# Patient Record
Sex: MALE | Race: WHITE | NOT HISPANIC OR LATINO
[De-identification: names, ages, dates, MRNs, and addresses within clinical notes are randomized per-mention and may not be internally consistent; named-entity substitution may affect disease eponyms.]

---

## 2019-07-29 PROBLEM — Z00.00 ENCOUNTER FOR PREVENTIVE HEALTH EXAMINATION: Status: ACTIVE | Noted: 2019-07-29

## 2019-10-03 ENCOUNTER — NON-APPOINTMENT (OUTPATIENT)
Age: 54
End: 2019-10-03

## 2019-10-03 ENCOUNTER — APPOINTMENT (OUTPATIENT)
Dept: CARDIOLOGY | Facility: CLINIC | Age: 54
End: 2019-10-03
Payer: COMMERCIAL

## 2019-10-03 ENCOUNTER — LABORATORY RESULT (OUTPATIENT)
Age: 54
End: 2019-10-03

## 2019-10-03 VITALS
HEART RATE: 77 BPM | DIASTOLIC BLOOD PRESSURE: 80 MMHG | SYSTOLIC BLOOD PRESSURE: 120 MMHG | TEMPERATURE: 98 F | BODY MASS INDEX: 38.09 KG/M2 | OXYGEN SATURATION: 97 % | HEIGHT: 66 IN | WEIGHT: 237 LBS

## 2019-10-03 DIAGNOSIS — Z78.9 OTHER SPECIFIED HEALTH STATUS: ICD-10-CM

## 2019-10-03 DIAGNOSIS — E78.5 HYPERLIPIDEMIA, UNSPECIFIED: ICD-10-CM

## 2019-10-03 DIAGNOSIS — Z00.00 ENCOUNTER FOR GENERAL ADULT MEDICAL EXAMINATION W/OUT ABNORMAL FINDINGS: ICD-10-CM

## 2019-10-03 DIAGNOSIS — Z86.69 PERSONAL HISTORY OF OTHER DISEASES OF THE NERVOUS SYSTEM AND SENSE ORGANS: ICD-10-CM

## 2019-10-03 DIAGNOSIS — Z01.810 ENCOUNTER FOR PREPROCEDURAL CARDIOVASCULAR EXAMINATION: ICD-10-CM

## 2019-10-03 DIAGNOSIS — E03.9 HYPOTHYROIDISM, UNSPECIFIED: ICD-10-CM

## 2019-10-03 DIAGNOSIS — Z86.39 PERSONAL HISTORY OF OTHER ENDOCRINE, NUTRITIONAL AND METABOLIC DISEASE: ICD-10-CM

## 2019-10-03 DIAGNOSIS — I10 ESSENTIAL (PRIMARY) HYPERTENSION: ICD-10-CM

## 2019-10-03 DIAGNOSIS — Z82.5 FAMILY HISTORY OF ASTHMA AND OTHER CHRONIC LOWER RESPIRATORY DISEASES: ICD-10-CM

## 2019-10-03 DIAGNOSIS — G47.30 SLEEP APNEA, UNSPECIFIED: ICD-10-CM

## 2019-10-03 DIAGNOSIS — J45.990 EXERCISE INDUCED BRONCHOSPASM: ICD-10-CM

## 2019-10-03 DIAGNOSIS — Z87.39 PERSONAL HISTORY OF OTHER DISEASES OF THE MUSCULOSKELETAL SYSTEM AND CONNECTIVE TISSUE: ICD-10-CM

## 2019-10-03 PROCEDURE — 90471 IMMUNIZATION ADMIN: CPT

## 2019-10-03 PROCEDURE — 93000 ELECTROCARDIOGRAM COMPLETE: CPT

## 2019-10-03 PROCEDURE — 93306 TTE W/DOPPLER COMPLETE: CPT

## 2019-10-03 PROCEDURE — 90686 IIV4 VACC NO PRSV 0.5 ML IM: CPT

## 2019-10-03 NOTE — PHYSICAL EXAM
[General Appearance - Well Developed] : well developed [Well Groomed] : well groomed [Normal Appearance] : normal appearance [General Appearance - Well Nourished] : well nourished [General Appearance - In No Acute Distress] : no acute distress [No Deformities] : no deformities [Normal Conjunctiva] : the conjunctiva exhibited no abnormalities [Eyelids - No Xanthelasma] : the eyelids demonstrated no xanthelasmas [Normal Oral Mucosa] : normal oral mucosa [No Oral Pallor] : no oral pallor [No Oral Cyanosis] : no oral cyanosis [Normal Jugular Venous A Waves Present] : normal jugular venous A waves present [No Jugular Venous Michael A Waves] : no jugular venous michael A waves [Normal Jugular Venous V Waves Present] : normal jugular venous V waves present [Exaggerated Use Of Accessory Muscles For Inspiration] : no accessory muscle use [Respiration, Rhythm And Depth] : normal respiratory rhythm and effort [Auscultation Breath Sounds / Voice Sounds] : lungs were clear to auscultation bilaterally [Abdomen Soft] : soft [Abdomen Tenderness] : non-tender [Abdomen Mass (___ Cm)] : no abdominal mass palpated [Abnormal Walk] : normal gait [Nail Clubbing] : no clubbing of the fingernails [Gait - Sufficient For Exercise Testing] : the gait was sufficient for exercise testing [Cyanosis, Localized] : no localized cyanosis [Petechial Hemorrhages (___cm)] : no petechial hemorrhages [Skin Color & Pigmentation] : normal skin color and pigmentation [] : no rash [No Venous Stasis] : no venous stasis [Skin Lesions] : no skin lesions [No Skin Ulcers] : no skin ulcer [No Xanthoma] : no  xanthoma was observed [Oriented To Time, Place, And Person] : oriented to person, place, and time [Affect] : the affect was normal [Mood] : the mood was normal [No Anxiety] : not feeling anxious [FreeTextEntry1] : Regular rate and rhythm, NL S1, S2, non-displaced PMI, chest non-tender; no rubs,heaves  or gallops a  Grade 2/6 systolic murmur noted at the LSB

## 2019-10-03 NOTE — REASON FOR VISIT
[FreeTextEntry1] : I was asked to see this delightful patient today for Pre-op Evaluation  prior t L2-5 laminectomy and microdiscetomy  with    __ Paul ___  at fax number  [phone]  .  The patient denies fever, cough, wheezing, sputum production, hemoptysis, dyspnea, congestion, diarrhea, constipation, nausea, vomiting, bright red blood per rectum, melena, angina, chest pain, palpitations, diaphoresis, PND, incontinence, frequency, urgency, dysuria, edema, joint pain, headache, weakness, numbness and dizziness. The date of the planned procedure is: 10-\par \par The patient presents for evaluation of high blood pressure. Patient is currently tolerating the current  antihypertensive regime and they deny headaches, stiff neck, visual changes, pedal Edema or PND. They also are here for follow-up of elevated cholesterol. Patient is currently tolerating medication and and does not complain of new  muscle pain, joint pain, back pain,urinary changes ,nausea, vomiting, abdominal pain or diarrhea. The patient is trying to follow a low cholesterol diet.\par The patient is here for follow-up of hypothyroidism. The patient states that they are taking their medicine regularly that they have no new complaints. They deny any  heat or cold intolerance hair loss and fatigue.\par

## 2019-10-04 RX ORDER — DULOXETINE HYDROCHLORIDE 60 MG/1
60 CAPSULE, DELAYED RELEASE PELLETS ORAL
Qty: 90 | Refills: 1 | Status: ACTIVE | COMMUNITY
Start: 2019-10-03 | End: 1900-01-01

## 2019-10-04 RX ORDER — DULOXETINE HYDROCHLORIDE 30 MG/1
30 CAPSULE, DELAYED RELEASE PELLETS ORAL
Qty: 90 | Refills: 1 | Status: ACTIVE | COMMUNITY
Start: 2019-10-03 | End: 1900-01-01

## 2019-10-07 DIAGNOSIS — E11.9 TYPE 2 DIABETES MELLITUS W/OUT COMPLICATIONS: ICD-10-CM

## 2019-10-07 DIAGNOSIS — E78.5 HYPERLIPIDEMIA, UNSPECIFIED: ICD-10-CM

## 2019-10-07 LAB
ABO + RH PNL BLD: NORMAL
ALBUMIN SERPL ELPH-MCNC: 4.8 G/DL
ALP BLD-CCNC: 78 U/L
ALT SERPL-CCNC: 38 U/L
ANION GAP SERPL CALC-SCNC: 14 MMOL/L
APPEARANCE: CLEAR
APTT BLD: 30.5 SEC
AST SERPL-CCNC: 20 U/L
BACTERIA: NEGATIVE
BASOPHILS # BLD AUTO: 0.04 K/UL
BASOPHILS NFR BLD AUTO: 0.4 %
BILIRUB SERPL-MCNC: 1 MG/DL
BILIRUBIN URINE: NEGATIVE
BLOOD URINE: NEGATIVE
BUN SERPL-MCNC: 13 MG/DL
CALCIUM SERPL-MCNC: 9.9 MG/DL
CHLORIDE SERPL-SCNC: 97 MMOL/L
CHOLEST SERPL-MCNC: 230 MG/DL
CHOLEST/HDLC SERPL: 4.8 RATIO
CO2 SERPL-SCNC: 29 MMOL/L
COLOR: YELLOW
CREAT SERPL-MCNC: 0.8 MG/DL
EOSINOPHIL # BLD AUTO: 0.17 K/UL
EOSINOPHIL NFR BLD AUTO: 1.8 %
ESTIMATED AVERAGE GLUCOSE: 157 MG/DL
GLUCOSE QUALITATIVE U: NEGATIVE
GLUCOSE SERPL-MCNC: 145 MG/DL
HBA1C MFR BLD HPLC: 7.1 %
HCT VFR BLD CALC: 47.1 %
HDLC SERPL-MCNC: 48 MG/DL
HGB BLD-MCNC: 15.1 G/DL
HYALINE CASTS: 0 /LPF
IMM GRANULOCYTES NFR BLD AUTO: 0.3 %
INR PPP: 1.03 RATIO
KETONES URINE: NEGATIVE
LDLC SERPL CALC-MCNC: 144 MG/DL
LEUKOCYTE ESTERASE URINE: NEGATIVE
LYMPHOCYTES # BLD AUTO: 1.85 K/UL
LYMPHOCYTES NFR BLD AUTO: 20.1 %
MAGNESIUM SERPL-MCNC: 1.8 MG/DL
MAN DIFF?: NORMAL
MCHC RBC-ENTMCNC: 30.4 PG
MCHC RBC-ENTMCNC: 32.1 GM/DL
MCV RBC AUTO: 95 FL
MICROSCOPIC-UA: NORMAL
MONOCYTES # BLD AUTO: 0.73 K/UL
MONOCYTES NFR BLD AUTO: 7.9 %
NEUTROPHILS # BLD AUTO: 6.38 K/UL
NEUTROPHILS NFR BLD AUTO: 69.5 %
NITRITE URINE: NEGATIVE
PH URINE: 7
PHOSPHATE SERPL-MCNC: 2.7 MG/DL
PLATELET # BLD AUTO: 191 K/UL
POTASSIUM SERPL-SCNC: 4.1 MMOL/L
PROT SERPL-MCNC: 7.5 G/DL
PROTEIN URINE: NEGATIVE
PSA SERPL-MCNC: 0.47 NG/ML
PT BLD: 11.7 SEC
RBC # BLD: 4.96 M/UL
RBC # FLD: 14.1 %
RED BLOOD CELLS URINE: 1 /HPF
SODIUM SERPL-SCNC: 140 MMOL/L
SPECIFIC GRAVITY URINE: 1.02
SQUAMOUS EPITHELIAL CELLS: 1 /HPF
T3RU NFR SERPL: 1 TBI
T4 FREE SERPL-MCNC: 1.3 NG/DL
T4 SERPL-MCNC: 7.4 UG/DL
TRIGL SERPL-MCNC: 189 MG/DL
TSH SERPL-ACNC: 2.2 UIU/ML
URATE SERPL-MCNC: 5.7 MG/DL
UROBILINOGEN URINE: NORMAL
WBC # FLD AUTO: 9.2 K/UL
WHITE BLOOD CELLS URINE: 1 /HPF

## 2019-10-07 RX ORDER — LOSARTAN POTASSIUM AND HYDROCHLOROTHIAZIDE 12.5; 5 MG/1; MG/1
50-12.5 TABLET ORAL DAILY
Qty: 90 | Refills: 1 | Status: DISCONTINUED | COMMUNITY
Start: 2019-10-03 | End: 2019-10-07

## 2019-10-07 RX ORDER — METFORMIN HYDROCHLORIDE 500 MG/1
500 TABLET, COATED ORAL DAILY
Qty: 90 | Refills: 1 | Status: ACTIVE | COMMUNITY
Start: 2019-10-07 | End: 1900-01-01

## 2019-10-07 RX ORDER — ROSUVASTATIN CALCIUM 10 MG/1
10 TABLET, FILM COATED ORAL
Qty: 90 | Refills: 1 | Status: ACTIVE | COMMUNITY
Start: 2019-10-07 | End: 1900-01-01

## 2019-10-16 ENCOUNTER — APPOINTMENT (OUTPATIENT)
Dept: CARDIOLOGY | Facility: CLINIC | Age: 54
End: 2019-10-16
Payer: COMMERCIAL

## 2019-10-16 PROCEDURE — A9500: CPT

## 2019-10-16 PROCEDURE — 78452 HT MUSCLE IMAGE SPECT MULT: CPT

## 2019-10-16 PROCEDURE — 93015 CV STRESS TEST SUPVJ I&R: CPT

## 2020-03-17 DIAGNOSIS — J45.909 UNSPECIFIED ASTHMA, UNCOMPLICATED: ICD-10-CM

## 2020-03-17 RX ORDER — ALBUTEROL SULFATE 90 UG/1
108 (90 BASE) POWDER, METERED RESPIRATORY (INHALATION)
Qty: 1 | Refills: 3 | Status: ACTIVE | COMMUNITY
Start: 2020-03-17 | End: 1900-01-01

## 2020-03-27 RX ORDER — IRBESARTAN AND HYDROCHLOROTHIAZIDE 150; 12.5 MG/1; MG/1
150-12.5 TABLET ORAL
Qty: 90 | Refills: 1 | Status: ACTIVE | COMMUNITY
Start: 2019-10-07 | End: 1900-01-01

## 2020-11-11 ENCOUNTER — TRANSCRIPTION ENCOUNTER (OUTPATIENT)
Age: 55
End: 2020-11-11

## 2020-12-02 ENCOUNTER — TRANSCRIPTION ENCOUNTER (OUTPATIENT)
Age: 55
End: 2020-12-02

## 2021-01-01 ENCOUNTER — TRANSCRIPTION ENCOUNTER (OUTPATIENT)
Age: 56
End: 2021-01-01

## 2021-01-15 ENCOUNTER — TRANSCRIPTION ENCOUNTER (OUTPATIENT)
Age: 56
End: 2021-01-15

## 2021-02-05 ENCOUNTER — TRANSCRIPTION ENCOUNTER (OUTPATIENT)
Age: 56
End: 2021-02-05

## 2021-03-03 ENCOUNTER — TRANSCRIPTION ENCOUNTER (OUTPATIENT)
Age: 56
End: 2021-03-03

## 2021-04-22 ENCOUNTER — TRANSCRIPTION ENCOUNTER (OUTPATIENT)
Age: 56
End: 2021-04-22

## 2021-06-03 ENCOUNTER — TRANSCRIPTION ENCOUNTER (OUTPATIENT)
Age: 56
End: 2021-06-03

## 2021-11-16 ENCOUNTER — TRANSCRIPTION ENCOUNTER (OUTPATIENT)
Age: 56
End: 2021-11-16

## 2022-03-10 ENCOUNTER — TRANSCRIPTION ENCOUNTER (OUTPATIENT)
Age: 57
End: 2022-03-10

## 2022-03-15 ENCOUNTER — APPOINTMENT (OUTPATIENT)
Dept: ENDOCRINOLOGY | Facility: CLINIC | Age: 57
End: 2022-03-15

## 2022-03-17 ENCOUNTER — TRANSCRIPTION ENCOUNTER (OUTPATIENT)
Age: 57
End: 2022-03-17

## 2022-04-19 ENCOUNTER — APPOINTMENT (OUTPATIENT)
Dept: PAIN MANAGEMENT | Facility: CLINIC | Age: 57
End: 2022-04-19

## 2022-04-27 ENCOUNTER — APPOINTMENT (OUTPATIENT)
Dept: PAIN MANAGEMENT | Facility: CLINIC | Age: 57
End: 2022-04-27

## 2022-05-29 ENCOUNTER — NON-APPOINTMENT (OUTPATIENT)
Age: 57
End: 2022-05-29

## 2022-07-28 ENCOUNTER — NON-APPOINTMENT (OUTPATIENT)
Age: 57
End: 2022-07-28

## 2022-07-29 ENCOUNTER — APPOINTMENT (OUTPATIENT)
Dept: PAIN MANAGEMENT | Facility: CLINIC | Age: 57
End: 2022-07-29
Payer: MEDICARE

## 2022-07-29 VITALS — HEIGHT: 66 IN | WEIGHT: 222 LBS | BODY MASS INDEX: 35.68 KG/M2

## 2022-07-29 PROCEDURE — J3490N: CUSTOM | Mod: NC

## 2022-07-29 PROCEDURE — 99214 OFFICE O/P EST MOD 30 MIN: CPT | Mod: 25

## 2022-07-29 PROCEDURE — J3490M: CUSTOM

## 2022-07-29 PROCEDURE — 20553 NJX 1/MLT TRIGGER POINTS 3/>: CPT

## 2022-07-29 NOTE — HISTORY OF PRESENT ILLNESS
[Lower back] : lower back [7] : 7 [2] : 2 [Sharp] : sharp [Shooting] : shooting [Stabbing] : stabbing [] : yes [Constant] : constant [Meds] : meds [Ice] : ice [Sitting] : sitting [Stairs] : stairs [FreeTextEntry1] : 07/29/2022: follow up today.  Would like repeat RFA.\par \par 2/14/22- Patient here for follow up. Complains of pain in left leg radiating down to toes. Will schedule left L4-5, L5-S1. Also complains of pain in low back. Will repeat B/L lumbar RFA.\par \par 11/15/21- Patient here for f/u. Would like a repeat B/L SIjoint RFA.\par \par 9/1/21: follow up today. pt reports increasing pain in the lower back with radiation to the SIJ's.\par \par 6/21/21: follow up today after caudal ROMULO on 6/8/21. He reports improvement. Right SIJ RFA was June of last year. and left RFA was in November of last year.\par \par 5/10/21: follow up today after caudal on 4/27/21. Had 80% relief from Caudal. Will Give TPI for tightness in right\par buttock.\par \par 4/20/21: follow up today after BERYL on 3/8/21. Since last visit had right shoulder surgery. (Dr. Vizcaino) on \par \par 3/30/21. Has not yet started PT. Neck feels better after BERYL.\par \par 3/3/21: follow up to review MRI. (3/1/21) 1. Straightening of lordosis with diffuse loss of disc signal and height and\par multilevel anterior spurring and bulging.\par 2. C2-C3: Bulge with central herniation.\par 3. C3-C4: Broad bulge and spondylotic ridge with central herniation. Luschka hypertrophy.\par 4. C4-C5: Broad bulge, spondylotic ridge, and broad herniation impressing on the thecal sac. Luschka\par hypertrophy with foraminal stenosis right greater than left.\par 5. C5-C6: Broad bulge, spondylotic ridge, and broad asymmetric to right and left herniation impressing on the\par thecal sac contacting the cord with mild central stenosis. Luschka hypertrophy with foraminal stenosis.\par 6. C6-C7: Broad bulge, spondylotic ridge, and central and asymmetric to right herniation impressing on the\par thecal sac. Luschka hypertrophy with foraminal stenosis left greater than right. Pain is radiating to the right shoulder and down the right arm.\par \par 2/23/21: follow up today after left L4/5 L5/S1 on 2/10/21. He reports an overall 80% improvement following. as his left side is feeling better, his right side is getting a little worse.\par \par 2/2/21: follow up today after b/l lumbar RFA on 1/19/21. getting gradual improvement. Had new MRI which was\par reviewed. It revealed L3/4 moderate to marked disc height loss. wide decompression and laminectomy with\par decompression of the spinal canal. L4/5 moderate to marked disc space height loss. wide decompression of the spinal canal without stenosis. broad based left disc herniation with impingement of the left L5 nerve root. facet arthrosis. moderate to marked left NF stenosis with impingement of the left L4 nerve root. L5/S1: wide decompression Dr. Miller is recommending a left L4 and L5 TFESI to address his left leg pain.\par \par 12/22/20: follow up today after bilatearl L4/5 TFESI on 12/7/20. Pt with great relief following. He reports the sciatica has resolved. Pain continues over the right lower back. will give tpi today.\par \par 11/30/20- Patient would like TPI today. Will do without steroid as it is too soon. Wants to schedule left SI joint RFA.\par \par 11/2/20: follow up today. Gets good relief with TPI's. Gets RFA's with good relief. Last right SIJ RFA was in May with relief until recently. Pain in the lower back. unable to have Caudal due to recent surgery. Intermittent pain in the bilateral legs.\par \par 9/21/20- Patient had 80% relief from lumbar RFA. Cannot have SIJ RFA until November and December. TPI today\par \par 7.13.20 follow up today after left SIJ RFA. Pt reports an overall 85% relief following. Pain in the lower back. Had lumbar RFA in 12/2018 with relief until recently. He was able to walk longer distances and ROM improved as well. [FreeTextEntry7] : both legs  [FreeTextEntry9] : RFA [de-identified] : Twisting

## 2022-07-29 NOTE — ASSESSMENT
[FreeTextEntry1] : Patient presents with axial lumbar pain that has not responded to  3 months of conservative therapy including physical therapy or NSAID therapy.  The pain is interfering with activities of daily living and functionality.   There is no radicular pain.   The pain is exacerbated by facet loading.  Positive Kemps maneuver which is defined by pain reproduction with extension and rotation of the lumbar spine to the affected side.  The patient has not had a vertebral fusion at the levels of the proposed treatment.  There is no unexplained neurologic deficit.  There is no history of systemic infection, unstable medical condition, bleeding tendency, or local infection.  The injection is being performed to diagnose the facet joint as the source of the individual's pain.\par \par After discussing various treatment options with the patient including but not limited to oral medications, physical therapy, exercise, modalities as well as interventional spinal injections, we have decided with the following plan:\par \par 1) Intervention Injection Therapy:\par I personally reviewed the MRI/CT scan images and agree with the radiologist's report. The radiological findings were discussed with the patient.\par The risks, benefits, contents and alternatives to injection were explained in full to the patient. Risks outlined include but are not limited to infection,sepsis, bleeding, post-dural puncture headache, nerve damage, temporary increase in pain, syncopal episode, failure to resolve symptoms, allergic reaction, symptom recurrence, and elevation of blood sugar in diabetics. Cortisone may cause immunosuppression. Patient understands the risks. All questions were answered. After discussion of options, patient requested an injection. Information regarding the injection was given to the patient. Which medications to stop prior to the injection was explained to the patient as well.\par \par Follow up in 1-2 weeks post injection for re-evaluation. \par Continue Home exercises, stretching, activity modification, physical therapy, and conservative care.\par \par \par \par B/L lumbar RFA

## 2022-07-31 ENCOUNTER — NON-APPOINTMENT (OUTPATIENT)
Age: 57
End: 2022-07-31

## 2022-08-02 ENCOUNTER — APPOINTMENT (OUTPATIENT)
Dept: PAIN MANAGEMENT | Facility: CLINIC | Age: 57
End: 2022-08-02

## 2022-08-02 PROCEDURE — J3490M: CUSTOM

## 2022-08-02 PROCEDURE — 64636 DESTROY L/S FACET JNT ADDL: CPT | Mod: RT

## 2022-08-02 PROCEDURE — 64635 DESTROY LUMB/SAC FACET JNT: CPT | Mod: RT

## 2022-08-02 NOTE — PROCEDURE
[FreeTextEntry3] : Date of Service: 08/02/2022 \par \par Account: [id number]\par \par Patient: DEEDEE SANZ \par \par YOB: 1965\par \par Age: 57 year\par \par \par PREOPERATIVE DIAGNOSIS: Spondylosis of Lumbar Region without Myelopathy or Radiculopathy (M47.816)\par \par     1. 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 \par \par         PROCEDURE:\par \par         1) Right L3, L4, L5 and Left L3, L4, L5 medial branch radiofrequency ablation under fluoroscopic guidance.                        \par \par Anesthesia:                                                      MAC\par \par \par Risks, benefits and alternatives of the procedure were discussed with the patient after which they agreed to proceed.  Patient was brought into fluoroscopy suite and was placed in prone position with hip support. Back was prepped and draped in a sterile fashion x3. Anesthesia initiated.\par \par Under AP visualization, the right and left sacral ala was identified and marked. Using a 25 gauge ½ inch needle the skin and subcutaneous structures at this point were localized with 1% Lidocaine using approximately 3 cc's 1% Lidocaine.  After this, a 20 gauge 100mm Kesha radiofrequency needle with a 10mm curved tip was inserted and using depth direction depth technique under constant fluoroscopic visualization, the needle was advanced to the sacral ala until os was contacted. \par \par The camera was then redirected under AP view to visualize the right and left L4 and L5 vertebra. The camera was obliqued to approximately 30 degrees to reveal good Mark dog anatomical view. The junction of the superior articulate process and transverse process at the L4 and L5 levels  were then identified and marked. Skin and subcutaneous structures were then anesthetized with approximately 3 cc's of 1% Lidocaine at each of these levels. After which a 20 gauge 100mm Newton radiofrequency needle with a 10mm curved tip then advanced until the junction at the SAP and transverse process was met.  The camera was then directed through the lateral view and under constant fluoroscopic visualization, the needle tips were then advanced and confirmed at the junction of the SAP and transverse process. \par \par The stylette for the most cephalad needle at the L4 level was then removed and a Newton radiofrequency probe was then placed inside the needle. After their pedis' were checked at approximately 300 ohm, 50 hertz sensory stimulation was performed.  Patient experienced concordant pain in their low back at approximately 0.3 volts. The voltage was then increased to 1 volt. The patient reported increased low back pain symptoms without any radiation below the knee.  Stimulation was then changed to 2 hertz and increased slowly by .1 volt increments at approximately 0.5 volts, patient began to experience thumping like reproductive pain in their low back. The voltage was then increased to approximately 2.5 volts. Patient experienced increasing thumping without any sensation below their knee. This exact stimulation was then repeated for the L5 needle as well as sacral ala needle with concordant pain and no radiation below the knee. The 6 levels were then anesthetized with approximately 0.5 cc's of 1% Lidocaine. After which each area was then ablated at 80 degrees centigrade for 90 seconds each. Patient felt no pain reproduction during the ablation procedure.  After each of these levels were ablated and injected approximately 1 cc of 0.25% Bupivacaine plus 80 mg of Kenalog were then injected before the needles were removed.  Pressure was then applied to the low back. Band-aids were applied.  Patient was brought to the recovery, ambulated on their own after the procedure and reported decreased low back pain.\par \par Anesthesia personnel were present throughout the procedure. Patient was told to apply ice to the low back for 20 minutes on and 20 minutes off for focal symptoms for 24-48 hours. They should call the office if they have any questions or concerns. \par \par Jenny Em M.D.\par

## 2022-08-16 ENCOUNTER — APPOINTMENT (OUTPATIENT)
Dept: PAIN MANAGEMENT | Facility: CLINIC | Age: 57
End: 2022-08-16

## 2022-09-19 ENCOUNTER — APPOINTMENT (OUTPATIENT)
Dept: PAIN MANAGEMENT | Facility: CLINIC | Age: 57
End: 2022-09-19

## 2022-09-19 VITALS — HEIGHT: 66 IN | WEIGHT: 222 LBS | BODY MASS INDEX: 35.68 KG/M2

## 2022-09-19 PROCEDURE — J3490M: CUSTOM

## 2022-09-19 PROCEDURE — 20552 NJX 1/MLT TRIGGER POINT 1/2: CPT

## 2022-09-19 PROCEDURE — 99214 OFFICE O/P EST MOD 30 MIN: CPT | Mod: 25

## 2022-09-19 NOTE — HISTORY OF PRESENT ILLNESS
[Lower back] : lower back [Ice] : ice [Sitting] : sitting [3] : 3 [1] : 2 [Dull/Aching] : dull/aching [Radiating] : radiating [Throbbing] : throbbing [Constant] : constant [Rest] : rest [Injection therapy] : injection therapy [Stairs] : stairs [FreeTextEntry1] : 09/19/2022: follow up today for B/L RFA on 8/2.  Had 80% relief so far.  Will give TPI and schedule TFESI.  \par 07/29/2022: follow up today.  Would like repeat RFA.\par \par 2/14/22- Patient here for follow up. Complains of pain in left leg radiating down to toes. Will schedule left L4-5, L5-S1. Also complains of pain in low back. Will repeat B/L lumbar RFA.\par \par 11/15/21- Patient here for f/u. Would like a repeat B/L SIjoint RFA.\par \par 9/1/21: follow up today. pt reports increasing pain in the lower back with radiation to the SIJ's.\par \par 6/21/21: follow up today after caudal ROMULO on 6/8/21. He reports improvement. Right SIJ RFA was June of last year. and left RFA was in November of last year.\par \par 5/10/21: follow up today after caudal on 4/27/21. Had 80% relief from Caudal. Will Give TPI for tightness in right\par buttock.\par \par 4/20/21: follow up today after BERYL on 3/8/21. Since last visit had right shoulder surgery. (Dr. Vizcaino) on \par \par 3/30/21. Has not yet started PT. Neck feels better after BERYL.\par \par 3/3/21: follow up to review MRI. (3/1/21) 1. Straightening of lordosis with diffuse loss of disc signal and height and\par multilevel anterior spurring and bulging.\par 2. C2-C3: Bulge with central herniation.\par 3. C3-C4: Broad bulge and spondylotic ridge with central herniation. Luschka hypertrophy.\par 4. C4-C5: Broad bulge, spondylotic ridge, and broad herniation impressing on the thecal sac. Luschka\par hypertrophy with foraminal stenosis right greater than left.\par 5. C5-C6: Broad bulge, spondylotic ridge, and broad asymmetric to right and left herniation impressing on the\par thecal sac contacting the cord with mild central stenosis. Luschka hypertrophy with foraminal stenosis.\par 6. C6-C7: Broad bulge, spondylotic ridge, and central and asymmetric to right herniation impressing on the\par thecal sac. Luschka hypertrophy with foraminal stenosis left greater than right. Pain is radiating to the right shoulder and down the right arm.\par \par 2/23/21: follow up today after left L4/5 L5/S1 on 2/10/21. He reports an overall 80% improvement following. as his left side is feeling better, his right side is getting a little worse.\par \par 2/2/21: follow up today after b/l lumbar RFA on 1/19/21. getting gradual improvement. Had new MRI which was\par reviewed. It revealed L3/4 moderate to marked disc height loss. wide decompression and laminectomy with\par decompression of the spinal canal. L4/5 moderate to marked disc space height loss. wide decompression of the spinal canal without stenosis. broad based left disc herniation with impingement of the left L5 nerve root. facet arthrosis. moderate to marked left NF stenosis with impingement of the left L4 nerve root. L5/S1: wide decompression Dr. Miller is recommending a left L4 and L5 TFESI to address his left leg pain.\par \par 12/22/20: follow up today after bilatearl L4/5 TFESI on 12/7/20. Pt with great relief following. He reports the sciatica has resolved. Pain continues over the right lower back. will give tpi today.\par \par 11/30/20- Patient would like TPI today. Will do without steroid as it is too soon. Wants to schedule left SI joint RFA.\par \par 11/2/20: follow up today. Gets good relief with TPI's. Gets RFA's with good relief. Last right SIJ RFA was in May with relief until recently. Pain in the lower back. unable to have Caudal due to recent surgery. Intermittent pain in the bilateral legs.\par \par 9/21/20- Patient had 80% relief from lumbar RFA. Cannot have SIJ RFA until November and December. TPI today\par \par 7.13.20 follow up today after left SIJ RFA. Pt reports an overall 85% relief following. Pain in the lower back. Had lumbar RFA in 12/2018 with relief until recently. He was able to walk longer distances and ROM improved as well. [] : no [FreeTextEntry7] : both legs  [FreeTextEntry9] : RFA [de-identified] : driving

## 2022-09-30 ENCOUNTER — APPOINTMENT (OUTPATIENT)
Dept: PAIN MANAGEMENT | Facility: CLINIC | Age: 57
End: 2022-09-30
Payer: MEDICARE

## 2022-09-30 ENCOUNTER — TRANSCRIPTION ENCOUNTER (OUTPATIENT)
Age: 57
End: 2022-09-30

## 2022-09-30 PROCEDURE — 64483 NJX AA&/STRD TFRM EPI L/S 1: CPT | Mod: 1L,RT

## 2022-09-30 NOTE — PROCEDURE
[FreeTextEntry3] : Date of Service: 09/30/2022 \par \par Account: [id number]\par \par Patient: DEEDEE SANZ \par \par YOB: 1965\par \par Age: 57 year\par   \par \par Surgeon:                               Jenny Em M.D.\par \par Assistant:                                 None.\par  \par Pre-Operative Diagnosis:     Lumbosacral Radiculitis (M54.17)                \par  \par Post Operative Diagnosis:    Lumbosacral Radiculitis (M54.17)                \par  \par Procedure:                              Right L4-5 transforaminal epidural steroid injection\par \par                                                   Left L4-5 transforaminal epidural steroid injection under fluoroscopic guidance.\par \par Anesthesia:                             MAC\par \par \par This procedure was carried out using fluoroscopic guidance.  The risks and benefits of the procedure were discussed extensively with the patient.  The consent of the patient was obtained and the following procedure was performed. The patient was placed in the prone position on the fluoroscopic table and the lumbar area was prepped and draped in a sterile fashion.\par \par The left L4-5 neural foramen was then identified on left oblique  "isidro dog" anatomical view at the 6 o' clock position using fluoroscopic guidance, and the area was marked. The overlying skin and subcutaneous structures were anesthetized using sterile technique with 1% Lidocaine.  A 22 gauge spinal needle was directed toward the inferior (6 o'clock) position of the pedicle, which formed the roof of the identified foramen.  Once in the epidural space, after negative aspiration for heme and CSF, 1cc of Omnipaque contrast was injected to confirm epidural location and assess filling defects and rule out intravascular needle placement.\par \par The following contrast flow observed: no intravascular or intrathecal flow pattern was noted.  No blood or CSF was aspirated. Omnipaque spread medially in epidural space. \par \par After this, an injectate of 3 cc preservative free normal saline plus 40 mg of Kenalog was injected in the epidural space.\par \par The right L4-5 neural foramen was then identified on right oblique "isidro dog" anatomical view at the 6 o' clock position using fluoroscopic guidance, and the area was marked. The overlying skin and subcutaneous structures were anesthetized using sterile technique with 1% Lidocaine.  A 22 gauge spinal needle was directed toward the inferior (6 o'clock) position of the pedicle, which formed the roof of the identified foramen.  Once in the epidural space, after negative aspiration for heme and CSF, 1cc of Omnipaque contrast was injected to confirm epidural location and assess filling defects and rule out intravascular needle placement.\par \par The following contrast flow observed: no intravascular or intrathecal flow pattern was noted.  No blood or CSF was aspirated. Omnipaque spread medially in epidural space. \par \par After this, an injectate of 3 cc preservative free normal saline plus 40 mg of Kenalog was injected in the epidural space.\par \par The needle was subsequently removed.  Vital signs remained normal.  Pulse oximeter was used throughout the procedure and the patient's pulse and oxygen saturation remained within normal limits.  The patient tolerated the procedure well.  There were no complications.  The patient was instructed to apply ice over the injection sites for twenty minutes every two hours for the next 24 to 48 hours.  The patient was also instructed to contact me immediately if there were any problems.\par \par Jenny Em M.D.

## 2022-10-19 ENCOUNTER — APPOINTMENT (OUTPATIENT)
Dept: PAIN MANAGEMENT | Facility: CLINIC | Age: 57
End: 2022-10-19

## 2022-11-08 ENCOUNTER — APPOINTMENT (OUTPATIENT)
Dept: PAIN MANAGEMENT | Facility: CLINIC | Age: 57
End: 2022-11-08

## 2022-11-08 VITALS — BODY MASS INDEX: 35.68 KG/M2 | WEIGHT: 222 LBS | HEIGHT: 66 IN

## 2022-11-08 PROCEDURE — ZZZZZ: CPT

## 2022-11-08 NOTE — HISTORY OF PRESENT ILLNESS
[Lower back] : lower back [Radiating] : radiating [Throbbing] : throbbing [Constant] : constant [Rest] : rest [Ice] : ice [Injection therapy] : injection therapy [Sitting] : sitting [Stairs] : stairs [6] : 6 [2] : 2 [Sharp] : sharp [Meds] : meds [FreeTextEntry1] : 11/8/22: follow up after B/L L4-5 TFESI on 9/30/22.  Had 60% relief.  Would like TPI on left side and repeat injection.\par \par 09/19/2022: follow up today for B/L RFA on 8/2.  Had 80% relief so far.  Will give TPI and schedule TFESI.  \par 07/29/2022: follow up today.  Would like repeat RFA.\par \par 2/14/22- Patient here for follow up. Complains of pain in left leg radiating down to toes. Will schedule left L4-5, L5-S1. Also complains of pain in low back. Will repeat B/L lumbar RFA.\par \par 11/15/21- Patient here for f/u. Would like a repeat B/L SIjoint RFA.\par \par 9/1/21: follow up today. pt reports increasing pain in the lower back with radiation to the SIJ's.\par \par 6/21/21: follow up today after caudal ROMULO on 6/8/21. He reports improvement. Right SIJ RFA was June of last year. and left RFA was in November of last year.\par \par 5/10/21: follow up today after caudal on 4/27/21. Had 80% relief from Caudal. Will Give TPI for tightness in right\par buttock.\par \par 4/20/21: follow up today after BERYL on 3/8/21. Since last visit had right shoulder surgery. (Dr. Vizcaino) on \par \par 3/30/21. Has not yet started PT. Neck feels better after BERYL.\par \par 3/3/21: follow up to review MRI. (3/1/21) 1. Straightening of lordosis with diffuse loss of disc signal and height and\par multilevel anterior spurring and bulging.\par 2. C2-C3: Bulge with central herniation.\par 3. C3-C4: Broad bulge and spondylotic ridge with central herniation. Luschka hypertrophy.\par 4. C4-C5: Broad bulge, spondylotic ridge, and broad herniation impressing on the thecal sac. Luschka\par hypertrophy with foraminal stenosis right greater than left.\par 5. C5-C6: Broad bulge, spondylotic ridge, and broad asymmetric to right and left herniation impressing on the\par thecal sac contacting the cord with mild central stenosis. Luschka hypertrophy with foraminal stenosis.\par 6. C6-C7: Broad bulge, spondylotic ridge, and central and asymmetric to right herniation impressing on the\par thecal sac. Luschka hypertrophy with foraminal stenosis left greater than right. Pain is radiating to the right shoulder and down the right arm.\par \par 2/23/21: follow up today after left L4/5 L5/S1 on 2/10/21. He reports an overall 80% improvement following. as his left side is feeling better, his right side is getting a little worse.\par \par 2/2/21: follow up today after b/l lumbar RFA on 1/19/21. getting gradual improvement. Had new MRI which was\par reviewed. It revealed L3/4 moderate to marked disc height loss. wide decompression and laminectomy with\par decompression of the spinal canal. L4/5 moderate to marked disc space height loss. wide decompression of the spinal canal without stenosis. broad based left disc herniation with impingement of the left L5 nerve root. facet arthrosis. moderate to marked left NF stenosis with impingement of the left L4 nerve root. L5/S1: wide decompression Dr. Miller is recommending a left L4 and L5 TFESI to address his left leg pain.\par \par 12/22/20: follow up today after bilatearl L4/5 TFESI on 12/7/20. Pt with great relief following. He reports the sciatica has resolved. Pain continues over the right lower back. will give tpi today.\par \par 11/30/20- Patient would like TPI today. Will do without steroid as it is too soon. Wants to schedule left SI joint RFA.\par \par 11/2/20: follow up today. Gets good relief with TPI's. Gets RFA's with good relief. Last right SIJ RFA was in May with relief until recently. Pain in the lower back. unable to have Caudal due to recent surgery. Intermittent pain in the bilateral legs.\par \par 9/21/20- Patient had 80% relief from lumbar RFA. Cannot have SIJ RFA until November and December. TPI today\par \par 7.13.20 follow up today after left SIJ RFA. Pt reports an overall 85% relief following. Pain in the lower back. Had lumbar RFA in 12/2018 with relief until recently. He was able to walk longer distances and ROM improved as well. [] : no [FreeTextEntry7] : left hip to the left side of the leg [FreeTextEntry9] : RFA [de-identified] : driving

## 2022-11-21 ENCOUNTER — APPOINTMENT (OUTPATIENT)
Dept: PAIN MANAGEMENT | Facility: CLINIC | Age: 57
End: 2022-11-21
Payer: MEDICARE

## 2022-11-21 PROCEDURE — 64483 NJX AA&/STRD TFRM EPI L/S 1: CPT | Mod: 50

## 2022-12-09 ENCOUNTER — APPOINTMENT (OUTPATIENT)
Dept: PAIN MANAGEMENT | Facility: CLINIC | Age: 57
End: 2022-12-09

## 2022-12-09 VITALS — HEIGHT: 66 IN | WEIGHT: 222 LBS | BODY MASS INDEX: 35.68 KG/M2

## 2022-12-09 PROCEDURE — J3490M: CUSTOM

## 2022-12-09 PROCEDURE — 99214 OFFICE O/P EST MOD 30 MIN: CPT | Mod: 25

## 2022-12-09 PROCEDURE — 20552 NJX 1/MLT TRIGGER POINT 1/2: CPT

## 2022-12-09 NOTE — HISTORY OF PRESENT ILLNESS
[Lower back] : lower back [2] : 2 [Radiating] : radiating [Sharp] : sharp [Throbbing] : throbbing [Rest] : rest [Meds] : meds [Ice] : ice [Injection therapy] : injection therapy [Sitting] : sitting [Stairs] : stairs [5] : 5 [Intermittent] : intermittent [FreeTextEntry1] : 12/9/22: follow up today after  B/L TFESI L4-5 on 11/21/22. Pt with >50% relief of his radicular pain. Still with pain over the bilateral SIJ. \par \par 11/8/22: follow up after B/L L4-5 TFESI on 9/30/22.  Had 60% relief.  Would like TPI on left side and repeat injection.\par \par 09/19/2022: follow up today for B/L RFA on 8/2.  Had 80% relief so far.  Will give TPI and schedule TFESI.  \par 07/29/2022: follow up today.  Would like repeat RFA.\par \par 2/14/22- Patient here for follow up. Complains of pain in left leg radiating down to toes. Will schedule left L4-5, L5-S1. Also complains of pain in low back. Will repeat B/L lumbar RFA.\par \par 11/15/21- Patient here for f/u. Would like a repeat B/L SIjoint RFA.\par \par 9/1/21: follow up today. pt reports increasing pain in the lower back with radiation to the SIJ's.\par \par 6/21/21: follow up today after caudal ROMULO on 6/8/21. He reports improvement. Right SIJ RFA was June of last year. and left RFA was in November of last year.\par \par 5/10/21: follow up today after caudal on 4/27/21. Had 80% relief from Caudal. Will Give TPI for tightness in right\par buttock.\par \par 4/20/21: follow up today after BERYL on 3/8/21. Since last visit had right shoulder surgery. (Dr. Vizcaino) on \par \par 3/30/21. Has not yet started PT. Neck feels better after BERYL.\par \par 3/3/21: follow up to review MRI. (3/1/21) 1. Straightening of lordosis with diffuse loss of disc signal and height and\par multilevel anterior spurring and bulging.\par 2. C2-C3: Bulge with central herniation.\par 3. C3-C4: Broad bulge and spondylotic ridge with central herniation. Luschka hypertrophy.\par 4. C4-C5: Broad bulge, spondylotic ridge, and broad herniation impressing on the thecal sac. Luschka\par hypertrophy with foraminal stenosis right greater than left.\par 5. C5-C6: Broad bulge, spondylotic ridge, and broad asymmetric to right and left herniation impressing on the\par thecal sac contacting the cord with mild central stenosis. Luschka hypertrophy with foraminal stenosis.\par 6. C6-C7: Broad bulge, spondylotic ridge, and central and asymmetric to right herniation impressing on the\par thecal sac. Luschka hypertrophy with foraminal stenosis left greater than right. Pain is radiating to the right shoulder and down the right arm.\par \par 2/23/21: follow up today after left L4/5 L5/S1 on 2/10/21. He reports an overall 80% improvement following. as his left side is feeling better, his right side is getting a little worse.\par \par 2/2/21: follow up today after b/l lumbar RFA on 1/19/21. getting gradual improvement. Had new MRI which was\par reviewed. It revealed L3/4 moderate to marked disc height loss. wide decompression and laminectomy with\par decompression of the spinal canal. L4/5 moderate to marked disc space height loss. wide decompression of the spinal canal without stenosis. broad based left disc herniation with impingement of the left L5 nerve root. facet arthrosis. moderate to marked left NF stenosis with impingement of the left L4 nerve root. L5/S1: wide decompression Dr. Miller is recommending a left L4 and L5 TFESI to address his left leg pain.\par \par 12/22/20: follow up today after bilatearl L4/5 TFESI on 12/7/20. Pt with great relief following. He reports the sciatica has resolved. Pain continues over the right lower back. will give tpi today.\par \par 11/30/20- Patient would like TPI today. Will do without steroid as it is too soon. Wants to schedule left SI joint RFA.\par \par 11/2/20: follow up today. Gets good relief with TPI's. Gets RFA's with good relief. Last right SIJ RFA was in May with relief until recently. Pain in the lower back. unable to have Caudal due to recent surgery. Intermittent pain in the bilateral legs.\par \par 9/21/20- Patient had 80% relief from lumbar RFA. Cannot have SIJ RFA until November and December. TPI today\par \par 7.13.20 follow up today after left SIJ RFA. Pt reports an overall 85% relief following. Pain in the lower back. Had lumbar RFA in 12/2018 with relief until recently. He was able to walk longer distances and ROM improved as well. [] : no [FreeTextEntry7] : left hip to the left side of the leg [FreeTextEntry9] : RFA [de-identified] : driving

## 2022-12-09 NOTE — PROCEDURE
[Bilateral] : bilaterally of the [Lumbar paraspinal muscle] : lumbar paraspinal muscle [___ cc    1%] : Lidocaine ~Vcc of 1%  [___ cc    0.25%] : Bupivacaine (Marcaine) ~Vcc of 0.25%  [___ cc    10mg] : Triamcinolone (Kenalog) ~Vcc of 10 mg  [Risks, benefits, alternatives discussed / Verbal consent obtained] : the risks benefits, and alternatives have been discussed, and verbal consent was obtained

## 2022-12-09 NOTE — ASSESSMENT
[FreeTextEntry1] : After discussing various treatment options with the patient including but not limited to oral medications, physical therapy, exercise, modalities as well as interventional spinal injections, we have decided with the following plan:\par \par 1) The risks, benefits, contents and alternatives to injection were explained in full to the patient.  Risks outlined include but are not limited to infection, sepsis, bleeding, scarring, skin discoloration, temporary increase in pain, syncopal episode, failure to resolve symptoms, allergic reaction, flare reaction, permanent white skin discoloration, symptom recurrence, and elevation of blood sugar in diabetics.  Patient understood the risks.  All questions were answered.  After discussion of options, patient requested an injection.  Oral informed consent was obtained and sterile prep was done of the injection site.  Sterile technique was used to introduce the mixture. The mixture consisted of 3 cc 1% lidocaine, 3cc 0.25% marcaine, and 10mg of kenalog.  Patient tolerated the procedure well.  Patient advised to ice the injection site this evening.  Signs and symptoms of infection reviewed and patient advised to call immediately for redness, fevers, and/or chills.\par \par

## 2023-01-09 ENCOUNTER — APPOINTMENT (OUTPATIENT)
Dept: PAIN MANAGEMENT | Facility: CLINIC | Age: 58
End: 2023-01-09

## 2023-01-10 ENCOUNTER — APPOINTMENT (OUTPATIENT)
Dept: PAIN MANAGEMENT | Facility: CLINIC | Age: 58
End: 2023-01-10

## 2023-04-06 ENCOUNTER — APPOINTMENT (OUTPATIENT)
Dept: ORTHOPEDIC SURGERY | Facility: CLINIC | Age: 58
End: 2023-04-06
Payer: MEDICARE

## 2023-04-06 VITALS — HEIGHT: 66 IN | WEIGHT: 220 LBS | BODY MASS INDEX: 35.36 KG/M2

## 2023-04-06 DIAGNOSIS — S46.102A UNSPECIFIED INJURY OF MUSCLE, FASCIA AND TENDON OF LONG HEAD OF BICEPS, LEFT ARM, INITIAL ENCOUNTER: ICD-10-CM

## 2023-04-06 DIAGNOSIS — S46.012A STRAIN OF MUSCLE(S) AND TENDON(S) OF THE ROTATOR CUFF OF LEFT SHOULDER, INITIAL ENCOUNTER: ICD-10-CM

## 2023-04-06 DIAGNOSIS — M75.22 BICIPITAL TENDINITIS, LEFT SHOULDER: ICD-10-CM

## 2023-04-06 DIAGNOSIS — E11.9 TYPE 2 DIABETES MELLITUS W/OUT COMPLICATIONS: ICD-10-CM

## 2023-04-06 DIAGNOSIS — M75.52 BURSITIS OF LEFT SHOULDER: ICD-10-CM

## 2023-04-06 DIAGNOSIS — M79.18 MYALGIA, OTHER SITE: ICD-10-CM

## 2023-04-06 PROCEDURE — 73030 X-RAY EXAM OF SHOULDER: CPT | Mod: LT

## 2023-04-06 PROCEDURE — 99204 OFFICE O/P NEW MOD 45 MIN: CPT | Mod: 25

## 2023-04-06 PROCEDURE — J3490M: CUSTOM

## 2023-04-06 PROCEDURE — 20611 DRAIN/INJ JOINT/BURSA W/US: CPT | Mod: LT

## 2023-04-06 PROCEDURE — 73010 X-RAY EXAM OF SHOULDER BLADE: CPT | Mod: LT

## 2023-04-06 NOTE — IMAGING
[de-identified] : \par LEFT SHOULDER\par Inspection: No swelling. \par Palpation: Tenderness is noted at the bicipital groove, anterior and lateral. \par Range of motion: There is pain with range of motion.\par , ER 55, @90ER 90, @90IR 30\par Strength: There is pain and discomfort with strength testing.\par Forward Flexion 4/5. Abduction 4/5.  External Rotation 5-/5 and Internal Rotation 4/5 \par Neurological testings: motor and sensor intact distally.\par Ligament Stability and Special Tests: \par There is positive arc of pain. \par Shoulder apprehension: neg\par Shoulder relocation: neg\par Kaufman’s test: pos\par Biceps Active test: neg\par Martinez Labral Shear: neg\par Impingement testing: pos\par Uriel testing: pos\par Whipple: pos\par Cross Body Adduction: neg\par \par

## 2023-04-06 NOTE — HISTORY OF PRESENT ILLNESS
[de-identified] : 58 year old male  (LHD, retired )  left shoulder pain since 2/2023 when fall off treatmill then worsening while throwing a baseball with kids\par The pain is located  posterior, lateral \par The pain is associated with clicking, snapping , waekness\par Worse with activity and better at rest.\par Has tried ice,heat, meloxicam , had mri at [facility]\par H/O L scope to clean up sx ~ approx 2000\par PMhx DM

## 2023-04-06 NOTE — ASSESSMENT
[FreeTextEntry1] : mri left shoulder [facility] - 3/30/23 - RTC tendinopathy with low grade partial tear mya,full thickness subscap tear approx 1cm, bicep tenodnitis with medial subluxation, bursiits,\par \par  Left X-Ray Examination of the SHOULDER (2 views):  No fractures, subluxations or dislocations. \par X-Ray Examination of the SCAPULA 1 or 2 views shows: No significant abnormalities.  Acromial spur. \par \par - We discussed their diagnosis and treatment options at length including the risks and benefits of both surgical and non-surgical options.\par - We will continue conservative treatment with a course of PT, icing, and anti-inflammatory medication.\par - The patient was provided with a prescription to work on scapular strengthening and rotator cuff strengthening.\par - The patient was advised to let pain guide the gradual advancement of activities. \par - We also discussed the possible of a corticosteroid injection in order to help decrease inflammation and pain so that they can perform better therapy.\par - The risks, benefits, and alternatives to corticosteroid injection were reviewed with the patient and they wished to proceed with this treatment course. \par - Follow up as needed in 6 weeks to re-evaluate progress with therapy, if not better L RCR, SAD, GHD, poss bic Td vs TT\par

## 2023-04-18 ENCOUNTER — APPOINTMENT (OUTPATIENT)
Dept: PAIN MANAGEMENT | Facility: CLINIC | Age: 58
End: 2023-04-18
Payer: MEDICARE

## 2023-04-18 VITALS — BODY MASS INDEX: 37.28 KG/M2 | HEIGHT: 66 IN | WEIGHT: 232 LBS

## 2023-04-18 PROCEDURE — J3490M: CUSTOM

## 2023-04-18 PROCEDURE — 20552 NJX 1/MLT TRIGGER POINT 1/2: CPT

## 2023-04-18 PROCEDURE — 99214 OFFICE O/P EST MOD 30 MIN: CPT | Mod: 25

## 2023-04-18 NOTE — ASSESSMENT
[FreeTextEntry1] : After discussing various treatment options with the patient including but not limited to oral medications, physical therapy, exercise, modalities as well as interventional spinal injections, we have decided with the following plan:\par \par 1) The risks, benefits, contents and alternatives to injection were explained in full to the patient.  Risks outlined include but are not limited to infection, sepsis, bleeding, scarring, skin discoloration, temporary increase in pain, syncopal episode, failure to resolve symptoms, allergic reaction, flare reaction, permanent white skin discoloration, symptom recurrence, and elevation of blood sugar in diabetics.  Patient understood the risks.  All questions were answered.  After discussion of options, patient requested an injection.  Oral informed consent was obtained and sterile prep was done of the injection site.  Sterile technique was used to introduce the mixture. The mixture consisted of 3 cc 1% lidocaine, 3cc 0.25% marcaine, and 10mg of kenalog.  Patient tolerated the procedure well.  Patient advised to ice the injection site this evening.  Signs and symptoms of infection reviewed and patient advised to call immediately for redness, fevers, and/or chills.\par \par 2) The risks, benefits, contents and alternatives to injection were explained in full to the patient.  Risks outlined include but are not limited to infection,sepsis, bleeding, post-dural puncture headache, nerve damage, temporary increase in pain, syncopal episode, failure to resolve symptoms, allergic reaction, symptom recurrence, and elevation of blood sugar in diabetics. Cortisone may cause immunosuppression.  Patient understands the risks.  All questions were answered.  After discussion of options, patient requested an injection.  Information regarding the injection was given to the patient.  Which medications to stop prior to the injection was explained to the patient as well\par \par Patient is presenting with acute/sub-acute radicular pain with impairment in ADLs and functionality.  The pain has not responded sufficiently to  conservative care including nsaid therapy and/or physical therapy.  There is no bleeding tendency, unstable medical condition, or systemic infection. The purpose of the spinal injections is to facilitate active therapy by providing short term relief through reduction of pain and inflammation. \par \par Injections, by themselves, are not likely to provide long-term relief. Rather, active rehabilitation with modified work achieves long-term relief by increasing active ROM, strength and stability. \par \par b/l L4/5 TFESI \par

## 2023-04-18 NOTE — HISTORY OF PRESENT ILLNESS
[Lower back] : lower back [Radiating] : radiating [Sharp] : sharp [Throbbing] : throbbing [Intermittent] : intermittent [Rest] : rest [Meds] : meds [Ice] : ice [Injection therapy] : injection therapy [Sitting] : sitting [Stairs] : stairs [6] : 6 [3] : 3 [FreeTextEntry1] : 4/18/23: follow up today. Had shoulder injection with Dr. Rhodes with improvement. has history of RA  and now having CMC joint pain.  Pain is currently in the lower back with radiation to the hips.  Most relief with TFESI's. \par \par 12/9/22: follow up today after  B/L TFESI L4-5 on 11/21/22. Pt with >50% relief of his radicular pain. Still with pain over the bilateral SIJ. \par \par 11/8/22: follow up after B/L L4-5 TFESI on 9/30/22.  Had 60% relief.  Would like TPI on left side and repeat injection.\par \par 09/19/2022: follow up today for B/L RFA on 8/2.  Had 80% relief so far.  Will give TPI and schedule TFESI.  \par 07/29/2022: follow up today.  Would like repeat RFA.\par \par 2/14/22- Patient here for follow up. Complains of pain in left leg radiating down to toes. Will schedule left L4-5, L5-S1. Also complains of pain in low back. Will repeat B/L lumbar RFA.\par \par 11/15/21- Patient here for f/u. Would like a repeat B/L SIjoint RFA.\par \par 9/1/21: follow up today. pt reports increasing pain in the lower back with radiation to the SIJ's.\par \par 6/21/21: follow up today after caudal ROMULO on 6/8/21. He reports improvement. Right SIJ RFA was June of last year. and left RFA was in November of last year.\par \par 5/10/21: follow up today after caudal on 4/27/21. Had 80% relief from Caudal. Will Give TPI for tightness in right\par buttock.\par \par 4/20/21: follow up today after BERYL on 3/8/21. Since last visit had right shoulder surgery. (Dr. Vizcaino) on \par \par 3/30/21. Has not yet started PT. Neck feels better after BERYL.\par \par 3/3/21: follow up to review MRI. (3/1/21) 1. Straightening of lordosis with diffuse loss of disc signal and height and\par multilevel anterior spurring and bulging.\par 2. C2-C3: Bulge with central herniation.\par 3. C3-C4: Broad bulge and spondylotic ridge with central herniation. Luschka hypertrophy.\par 4. C4-C5: Broad bulge, spondylotic ridge, and broad herniation impressing on the thecal sac. Luschka\par hypertrophy with foraminal stenosis right greater than left.\par 5. C5-C6: Broad bulge, spondylotic ridge, and broad asymmetric to right and left herniation impressing on the\par thecal sac contacting the cord with mild central stenosis. Luschka hypertrophy with foraminal stenosis.\par 6. C6-C7: Broad bulge, spondylotic ridge, and central and asymmetric to right herniation impressing on the\par thecal sac. Luschka hypertrophy with foraminal stenosis left greater than right. Pain is radiating to the right shoulder and down the right arm.\par \par 2/23/21: follow up today after left L4/5 L5/S1 on 2/10/21. He reports an overall 80% improvement following. as his left side is feeling better, his right side is getting a little worse.\par \par 2/2/21: follow up today after b/l lumbar RFA on 1/19/21. getting gradual improvement. Had new MRI which was\par reviewed. It revealed L3/4 moderate to marked disc height loss. wide decompression and laminectomy with\par decompression of the spinal canal. L4/5 moderate to marked disc space height loss. wide decompression of the spinal canal without stenosis. broad based left disc herniation with impingement of the left L5 nerve root. facet arthrosis. moderate to marked left NF stenosis with impingement of the left L4 nerve root. L5/S1: wide decompression Dr. Miller is recommending a left L4 and L5 TFESI to address his left leg pain.\par \par 12/22/20: follow up today after bilatearl L4/5 TFESI on 12/7/20. Pt with great relief following. He reports the sciatica has resolved. Pain continues over the right lower back. will give tpi today.\par \par 11/30/20- Patient would like TPI today. Will do without steroid as it is too soon. Wants to schedule left SI joint RFA.\par \par 11/2/20: follow up today. Gets good relief with TPI's. Gets RFA's with good relief. Last right SIJ RFA was in May with relief until recently. Pain in the lower back. unable to have Caudal due to recent surgery. Intermittent pain in the bilateral legs.\par \par 9/21/20- Patient had 80% relief from lumbar RFA. Cannot have SIJ RFA until November and December. TPI today\par \par 7.13.20 follow up today after left SIJ RFA. Pt reports an overall 85% relief following. Pain in the lower back. Had lumbar RFA in 12/2018 with relief until recently. He was able to walk longer distances and ROM improved as well. [] : no [FreeTextEntry7] : left hip to the left side of the leg [FreeTextEntry9] : RFA [de-identified] : driving

## 2023-04-25 ENCOUNTER — APPOINTMENT (OUTPATIENT)
Dept: PAIN MANAGEMENT | Facility: CLINIC | Age: 58
End: 2023-04-25
Payer: MEDICARE

## 2023-04-25 PROCEDURE — 64483 NJX AA&/STRD TFRM EPI L/S 1: CPT | Mod: 50

## 2023-04-25 NOTE — PROCEDURE
[FreeTextEntry3] : Date of Service: 04/25/2023 \par \par Account: [id number]\par \par Patient: DEEDEE SANZ \par \par YOB: 1965\par \par Age: 58 year\par   \par \par Surgeon:                               Jenny Em M.D.\par \par Assistant:                                 None.\par  \par Pre-Operative Diagnosis:     Lumbosacral Radiculitis (M54.17)                \par  \par Post Operative Diagnosis:    Lumbosacral Radiculitis (M54.17)                \par  \par Procedure:                              Right L4-5 transforaminal epidural steroid injection\par \par                                                   Left L4-5 transforaminal epidural steroid injection under fluoroscopic guidance.\par \par Anesthesia:                             MAC\par \par \par This procedure was carried out using fluoroscopic guidance.  The risks and benefits of the procedure were discussed extensively with the patient.  The consent of the patient was obtained and the following procedure was performed. The patient was placed in the prone position on the fluoroscopic table and the lumbar area was prepped and draped in a sterile fashion.\par \par The left L4-5 neural foramen was then identified on left oblique  "isidro dog" anatomical view at the 6 o' clock position using fluoroscopic guidance, and the area was marked. The overlying skin and subcutaneous structures were anesthetized using sterile technique with 1% Lidocaine.  A 22 gauge spinal needle was directed toward the inferior (6 o'clock) position of the pedicle, which formed the roof of the identified foramen.  Once in the epidural space, after negative aspiration for heme and CSF, 1cc of Omnipaque contrast was injected to confirm epidural location and assess filling defects and rule out intravascular needle placement.\par \par The following contrast flow observed: no intravascular or intrathecal flow pattern was noted.  No blood or CSF was aspirated. Omnipaque spread medially in epidural space. \par \par After this, an injectate of 3 cc preservative free normal saline plus 40 mg of Kenalog was injected in the epidural space.\par \par The right L4-5 neural foramen was then identified on right oblique "isidro dog" anatomical view at the 6 o' clock position using fluoroscopic guidance, and the area was marked. The overlying skin and subcutaneous structures were anesthetized using sterile technique with 1% Lidocaine.  A 22 gauge spinal needle was directed toward the inferior (6 o'clock) position of the pedicle, which formed the roof of the identified foramen.  Once in the epidural space, after negative aspiration for heme and CSF, 1cc of Omnipaque contrast was injected to confirm epidural location and assess filling defects and rule out intravascular needle placement.\par \par The following contrast flow observed: no intravascular or intrathecal flow pattern was noted.  No blood or CSF was aspirated. Omnipaque spread medially in epidural space. \par \par After this, an injectate of 3 cc preservative free normal saline plus 40 mg of Kenalog was injected in the epidural space.\par \par The needle was subsequently removed.  Vital signs remained normal.  Pulse oximeter was used throughout the procedure and the patient's pulse and oxygen saturation remained within normal limits.  The patient tolerated the procedure well.  There were no complications.  The patient was instructed to apply ice over the injection sites for twenty minutes every two hours for the next 24 to 48 hours.  The patient was also instructed to contact me immediately if there were any problems.\par \par Jenny Em M.D.

## 2023-05-10 ENCOUNTER — APPOINTMENT (OUTPATIENT)
Dept: PAIN MANAGEMENT | Facility: CLINIC | Age: 58
End: 2023-05-10

## 2023-05-11 NOTE — HISTORY OF PRESENT ILLNESS
[Lower back] : lower back [6] : 6 [3] : 3 [Radiating] : radiating [Sharp] : sharp [Throbbing] : throbbing [Intermittent] : intermittent [Rest] : rest [Meds] : meds [Ice] : ice [Injection therapy] : injection therapy [Sitting] : sitting [Stairs] : stairs [FreeTextEntry1] : 5/10/23- fu for B/L L4-5 TFESI on 4/25\par \par 4/18/23: follow up today. Had shoulder injection with Dr. Rhodes with improvement. has history of RA  and now having CMC joint pain.  Pain is currently in the lower back with radiation to the hips.  Most relief with TFESI's. \par \par 12/9/22: follow up today after  B/L TFESI L4-5 on 11/21/22. Pt with >50% relief of his radicular pain. Still with pain over the bilateral SIJ. \par \par 11/8/22: follow up after B/L L4-5 TFESI on 9/30/22.  Had 60% relief.  Would like TPI on left side and repeat injection.\par \par 09/19/2022: follow up today for B/L RFA on 8/2.  Had 80% relief so far.  Will give TPI and schedule TFESI.  \par 07/29/2022: follow up today.  Would like repeat RFA.\par \par 2/14/22- Patient here for follow up. Complains of pain in left leg radiating down to toes. Will schedule left L4-5, L5-S1. Also complains of pain in low back. Will repeat B/L lumbar RFA.\par \par 11/15/21- Patient here for f/u. Would like a repeat B/L SIjoint RFA.\par \par 9/1/21: follow up today. pt reports increasing pain in the lower back with radiation to the SIJ's.\par \par 6/21/21: follow up today after caudal ROMULO on 6/8/21. He reports improvement. Right SIJ RFA was June of last year. and left RFA was in November of last year.\par \par 5/10/21: follow up today after caudal on 4/27/21. Had 80% relief from Caudal. Will Give TPI for tightness in right\par buttock.\par \par 4/20/21: follow up today after BERYL on 3/8/21. Since last visit had right shoulder surgery. (Dr. Vizcaino) on \par \par 3/30/21. Has not yet started PT. Neck feels better after BERYL.\par \par 3/3/21: follow up to review MRI. (3/1/21) 1. Straightening of lordosis with diffuse loss of disc signal and height and\par multilevel anterior spurring and bulging.\par 2. C2-C3: Bulge with central herniation.\par 3. C3-C4: Broad bulge and spondylotic ridge with central herniation. Luschka hypertrophy.\par 4. C4-C5: Broad bulge, spondylotic ridge, and broad herniation impressing on the thecal sac. Luschka\par hypertrophy with foraminal stenosis right greater than left.\par 5. C5-C6: Broad bulge, spondylotic ridge, and broad asymmetric to right and left herniation impressing on the\par thecal sac contacting the cord with mild central stenosis. Luschka hypertrophy with foraminal stenosis.\par 6. C6-C7: Broad bulge, spondylotic ridge, and central and asymmetric to right herniation impressing on the\par thecal sac. Luschka hypertrophy with foraminal stenosis left greater than right. Pain is radiating to the right shoulder and down the right arm.\par \par 2/23/21: follow up today after left L4/5 L5/S1 on 2/10/21. He reports an overall 80% improvement following. as his left side is feeling better, his right side is getting a little worse.\par \par 2/2/21: follow up today after b/l lumbar RFA on 1/19/21. getting gradual improvement. Had new MRI which was\par reviewed. It revealed L3/4 moderate to marked disc height loss. wide decompression and laminectomy with\par decompression of the spinal canal. L4/5 moderate to marked disc space height loss. wide decompression of the spinal canal without stenosis. broad based left disc herniation with impingement of the left L5 nerve root. facet arthrosis. moderate to marked left NF stenosis with impingement of the left L4 nerve root. L5/S1: wide decompression Dr. Miller is recommending a left L4 and L5 TFESI to address his left leg pain.\par \par 12/22/20: follow up today after bilatearl L4/5 TFESI on 12/7/20. Pt with great relief following. He reports the sciatica has resolved. Pain continues over the right lower back. will give tpi today.\par \par 11/30/20- Patient would like TPI today. Will do without steroid as it is too soon. Wants to schedule left SI joint RFA.\par \par 11/2/20: follow up today. Gets good relief with TPI's. Gets RFA's with good relief. Last right SIJ RFA was in May with relief until recently. Pain in the lower back. unable to have Caudal due to recent surgery. Intermittent pain in the bilateral legs.\par \par 9/21/20- Patient had 80% relief from lumbar RFA. Cannot have SIJ RFA until November and December. TPI today\par \par 7.13.20 follow up today after left SIJ RFA. Pt reports an overall 85% relief following. Pain in the lower back. Had lumbar RFA in 12/2018 with relief until recently. He was able to walk longer distances and ROM improved as well. [] : no [FreeTextEntry7] : left hip to the left side of the leg [FreeTextEntry9] : RFA [de-identified] : driving

## 2023-05-30 ENCOUNTER — APPOINTMENT (OUTPATIENT)
Dept: PAIN MANAGEMENT | Facility: CLINIC | Age: 58
End: 2023-05-30
Payer: MEDICARE

## 2023-05-30 VITALS — HEIGHT: 66 IN | BODY MASS INDEX: 38.09 KG/M2 | WEIGHT: 237 LBS

## 2023-05-30 PROCEDURE — J3490M: CUSTOM

## 2023-05-30 PROCEDURE — 99214 OFFICE O/P EST MOD 30 MIN: CPT | Mod: 25

## 2023-05-30 PROCEDURE — 20552 NJX 1/MLT TRIGGER POINT 1/2: CPT

## 2023-05-30 NOTE — ASSESSMENT
[FreeTextEntry1] : After discussing various treatment options with the patient including but not limited to oral medications, physical therapy, exercise, modalities as well as interventional spinal injections, we have decided with the following plan:\par \par 1) The risks, benefits, contents and alternatives to injection were explained in full to the patient.  Risks outlined include but are not limited to infection, sepsis, bleeding, scarring, skin discoloration, temporary increase in pain, syncopal episode, failure to resolve symptoms, allergic reaction, flare reaction, permanent white skin discoloration, symptom recurrence, and elevation of blood sugar in diabetics.  Patient understood the risks.  All questions were answered.  After discussion of options, patient requested an injection.  Oral informed consent was obtained and sterile prep was done of the injection site.  Sterile technique was used to introduce the mixture. The mixture consisted of 3 cc 1% lidocaine, 3cc 0.25% marcaine, and 10mg of kenalog.  Patient tolerated the procedure well.  Patient advised to ice the injection site this evening.  Signs and symptoms of infection reviewed and patient advised to call immediately for redness, fevers, and/or chills.\par \par 2) The risks, benefits, contents and alternatives to injection were explained in full to the patient.  Risks outlined include but are not limited to infection,sepsis, bleeding, post-dural puncture headache, nerve damage, temporary increase in pain, syncopal episode, failure to resolve symptoms, allergic reaction, symptom recurrence, and elevation of blood sugar in diabetics. Cortisone may cause immunosuppression.  Patient understands the risks.  All questions were answered.  After discussion of options, patient requested an injection.  Information regarding the injection was given to the patient.  Which medications to stop prior to the injection was explained to the patient as well\par \par Patient is presenting with acute/sub-acute radicular pain with impairment in ADLs and functionality.  The pain has not responded sufficiently to  conservative care including nsaid therapy and/or physical therapy.  There is no bleeding tendency, unstable medical condition, or systemic infection. The purpose of the spinal injections is to facilitate active therapy by providing short term relief through reduction of pain and inflammation. \par \par Patient has lumbosacral axial pain that is consistent with facet joint pathology.  A diagnostic temporary block with local anesthetic  of the medial branch was performed and has resulted in at least a 80% reduction in pain for the duration of the specific local anesthetic effect.  The pain is not radicular and there is absence of nerve root compression.  There is no prior spinal fusion surgery at the level targeted.  The pain has failed to respond to three months of conservative therapy.\par \par b/l lumbar RFA\par

## 2023-05-30 NOTE — HISTORY OF PRESENT ILLNESS
[Lower back] : lower back [Radiating] : radiating [Sharp] : sharp [Throbbing] : throbbing [Rest] : rest [Meds] : meds [Ice] : ice [Injection therapy] : injection therapy [Sitting] : sitting [Stairs] : stairs [Left Leg] : left leg [Right Leg] : right leg [5] : 5 [2] : 2 [Dull/Aching] : dull/aching [Constant] : constant [Household chores] : household chores [Leisure] : leisure [Sleep] : sleep [Social interactions] : social interactions [Standing] : standing [Walking] : walking [Bending forward] : bending forward [FreeTextEntry1] : 5/30/23- fu for B/L L4-L5 TFESI on 4/25 40% relief. still with pain in the back with radiation to the lateral thighs.  worse with extension. \par \par 4/18/23: follow up today. Had shoulder injection with Dr. Rhodes with improvement. has history of RA  and now having CMC joint pain.  Pain is currently in the lower back with radiation to the hips.  Most relief with TFESI's. \par \par 12/9/22: follow up today after  B/L TFESI L4-5 on 11/21/22. Pt with >50% relief of his radicular pain. Still with pain over the bilateral SIJ. \par \par 11/8/22: follow up after B/L L4-5 TFESI on 9/30/22.  Had 60% relief.  Would like TPI on left side and repeat injection.\par \par 09/19/2022: follow up today for B/L RFA on 8/2.  Had 80% relief so far.  Will give TPI and schedule TFESI.  \par 07/29/2022: follow up today.  Would like repeat RFA.\par \par 2/14/22- Patient here for follow up. Complains of pain in left leg radiating down to toes. Will schedule left L4-5, L5-S1. Also complains of pain in low back. Will repeat B/L lumbar RFA.\par \par 11/15/21- Patient here for f/u. Would like a repeat B/L SI joint RFA.\par \par 9/1/21: follow up today. pt reports increasing pain in the lower back with radiation to the SIJ's.\par \par 6/21/21: follow up today after caudal ROMULO on 6/8/21. He reports improvement. Right SIJ RFA was June of last year. and left RFA was in November of last year.\par \par 5/10/21: follow up today after caudal on 4/27/21. Had 80% relief from Caudal. Will Give TPI for tightness in right\par buttock.\par \par 4/20/21: follow up today after BERYL on 3/8/21. Since last visit had right shoulder surgery. (Dr. Vizcaino) on \par \par 3/30/21. Has not yet started PT. Neck feels better after BERYL.\par \par 3/3/21: follow up to review MRI. (3/1/21) 1. Straightening of lordosis with diffuse loss of disc signal and height and\par multilevel anterior spurring and bulging.\par 2. C2-C3: Bulge with central herniation.\par 3. C3-C4: Broad bulge and spondylotic ridge with central herniation. Luschka hypertrophy.\par 4. C4-C5: Broad bulge, spondylotic ridge, and broad herniation impressing on the thecal sac. Luschka\par hypertrophy with foraminal stenosis right greater than left.\par 5. C5-C6: Broad bulge, spondylotic ridge, and broad asymmetric to right and left herniation impressing on the\par thecal sac contacting the cord with mild central stenosis. Luschka hypertrophy with foraminal stenosis.\par 6. C6-C7: Broad bulge, spondylotic ridge, and central and asymmetric to right herniation impressing on the\par thecal sac. Luschka hypertrophy with foraminal stenosis left greater than right. Pain is radiating to the right shoulder and down the right arm.\par \par 2/23/21: follow up today after left L4/5 L5/S1 on 2/10/21. He reports an overall 80% improvement following. as his left side is feeling better, his right side is getting a little worse.\par \par 2/2/21: follow up today after b/l lumbar RFA on 1/19/21. getting gradual improvement. Had new MRI which was\par reviewed. It revealed L3/4 moderate to marked disc height loss. wide decompression and laminectomy with\par decompression of the spinal canal. L4/5 moderate to marked disc space height loss. wide decompression of the spinal canal without stenosis. broad based left disc herniation with impingement of the left L5 nerve root. facet arthrosis. moderate to marked left NF stenosis with impingement of the left L4 nerve root. L5/S1: wide decompression Dr. Miller is recommending a left L4 and L5 TFESI to address his left leg pain.\par \par 12/22/20: follow up today after bilateral L4/5 TFESI on 12/7/20. Pt with great relief following. He reports the sciatica has resolved. Pain continues over the right lower back. will give tpi today.\par \par 11/30/20- Patient would like TPI today. Will do without steroid as it is too soon. Wants to schedule left SI joint RFA.\par \par 11/2/20: follow up today. Gets good relief with TPI's. Gets RFA's with good relief. Last right SIJ RFA was in May with relief until recently. Pain in the lower back. unable to have Caudal due to recent surgery. Intermittent pain in the bilateral legs.\par \par 9/21/20- Patient had 80% relief from lumbar RFA. Cannot have SIJ RFA until November and December. TPI today\par \par 7.13.20 follow up today after left SIJ RFA. Pt reports an overall 85% relief following. Pain in the lower back. Had lumbar RFA in 12/2018 with relief until recently. He was able to walk longer distances and ROM improved as well. [] : no [FreeTextEntry9] : RFA [de-identified] : driving

## 2023-06-12 ENCOUNTER — APPOINTMENT (OUTPATIENT)
Dept: PAIN MANAGEMENT | Facility: CLINIC | Age: 58
End: 2023-06-12
Payer: MEDICARE

## 2023-06-12 PROCEDURE — 82948 REAGENT STRIP/BLOOD GLUCOSE: CPT

## 2023-06-12 PROCEDURE — 64635 DESTROY LUMB/SAC FACET JNT: CPT | Mod: 50

## 2023-06-12 PROCEDURE — 64636Z: CUSTOM | Mod: 50

## 2023-06-12 PROCEDURE — J3490M: CUSTOM

## 2023-06-12 NOTE — PROCEDURE
[FreeTextEntry3] : Date of Service: 06/12/2023 \par \par Account: [id number]\par \par Patient: DEEDEE SANZ \par \par YOB: 1965\par \par Age: 58 year\par \par \par PREOPERATIVE DIAGNOSIS: Spondylosis of Lumbar Region without Myelopathy or Radiculopathy (M47.816)\par \par     1. 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 \par \par         PROCEDURE:\par \par         1) Right L3, L4, L5 and Left L3, L4, L5 medial branch radiofrequency ablation under fluoroscopic guidance.                        \par \par Anesthesia:                                                      MAC\par \par \par Risks, benefits and alternatives of the procedure were discussed with the patient after which they agreed to proceed.  Patient was brought into fluoroscopy suite and was placed in prone position with hip support. Back was prepped and draped in a sterile fashion x3. Anesthesia initiated.\par \par Under AP visualization, the right and left sacral ala was identified and marked. Using a 25 gauge ½ inch needle the skin and subcutaneous structures at this point were localized with 1% Lidocaine using approximately 3 cc's 1% Lidocaine.  After this, a 20 gauge 100mm [place] radiofrequency needle with a 10mm curved tip was inserted and using depth direction depth technique under constant fluoroscopic visualization, the needle was advanced to the sacral ala until os was contacted. \par \par The camera was then redirected under AP view to visualize the right and left L4 and L5 vertebra. The camera was obliqued to approximately 30 degrees to reveal good Mark dog anatomical view. The junction of the superior articulate process and transverse process at the L4 and L5 levels  were then identified and marked. Skin and subcutaneous structures were then anesthetized with approximately 3 cc's of 1% Lidocaine at each of these levels. After which a 20 gauge 100mm [place] radiofrequency needle with a 10mm curved tip then advanced until the junction at the SAP and transverse process was met.  The camera was then directed through the lateral view and under constant fluoroscopic visualization, the needle tips were then advanced and confirmed at the junction of the SAP and transverse process. \par \par The stylette for the most cephalad needle at the L4 level was then removed and a [place] radiofrequency probe was then placed inside the needle. After their pedis' were checked at approximately 300 ohm, 50 hertz sensory stimulation was performed.  Patient experienced concordant pain in their low back at approximately 0.3 volts. The voltage was then increased to 1 volt. The patient reported increased low back pain symptoms without any radiation below the knee.  Stimulation was then changed to 2 hertz and increased slowly by .1 volt increments at approximately 0.5 volts, patient began to experience thumping like reproductive pain in their low back. The voltage was then increased to approximately 2.5 volts. Patient experienced increasing thumping without any sensation below their knee. This exact stimulation was then repeated for the L5 needle as well as sacral ala needle with concordant pain and no radiation below the knee. The 6 levels were then anesthetized with approximately 0.5 cc's of 1% Lidocaine. After which each area was then ablated at 80 degrees centigrade for 90 seconds each. Patient felt no pain reproduction during the ablation procedure.  After each of these levels were ablated and injected approximately 1 cc of 0.25% Bupivacaine plus 80 mg of Kenalog were then injected before the needles were removed.  Pressure was then applied to the low back. Band-aids were applied.  Patient was brought to the recovery, ambulated on their own after the procedure and reported decreased low back pain.\par \par Anesthesia personnel were present throughout the procedure. Patient was told to apply ice to the low back for 20 minutes on and 20 minutes off for focal symptoms for 24-48 hours. They should call the office if they have any questions or concerns. \par \par Jenny Em M.D.\par

## 2023-06-28 ENCOUNTER — APPOINTMENT (OUTPATIENT)
Dept: PAIN MANAGEMENT | Facility: CLINIC | Age: 58
End: 2023-06-28
Payer: MEDICARE

## 2023-06-28 VITALS — WEIGHT: 237 LBS | BODY MASS INDEX: 38.09 KG/M2 | HEIGHT: 66 IN

## 2023-06-28 PROCEDURE — J3490M: CUSTOM

## 2023-06-28 PROCEDURE — 99213 OFFICE O/P EST LOW 20 MIN: CPT | Mod: 25

## 2023-06-28 PROCEDURE — 20552 NJX 1/MLT TRIGGER POINT 1/2: CPT

## 2023-06-28 NOTE — HISTORY OF PRESENT ILLNESS
[Lower back] : lower back [Left Leg] : left leg [Right Leg] : right leg [5] : 5 [2] : 2 [Dull/Aching] : dull/aching [Radiating] : radiating [Sharp] : sharp [Throbbing] : throbbing [Constant] : constant [Household chores] : household chores [Leisure] : leisure [Sleep] : sleep [Social interactions] : social interactions [Rest] : rest [Meds] : meds [Ice] : ice [Injection therapy] : injection therapy [Sitting] : sitting [Standing] : standing [Walking] : walking [Bending forward] : bending forward [Stairs] : stairs [FreeTextEntry1] : 6/28/23- fu for B/L Lumbar RFA on 6/12 had 60% relief so far.  Would like TPI.  \par \par 5/30/23- fu for B/L L4-L5 TFESI on 4/25 40% relief. still with pain in the back with radiation to the lateral thighs.  worse with extension. \par \par 4/18/23: follow up today. Had shoulder injection with Dr. Rhodes with improvement. has history of RA  and now having CMC joint pain.  Pain is currently in the lower back with radiation to the hips.  Most relief with TFESI's. \par \par 12/9/22: follow up today after  B/L TFESI L4-5 on 11/21/22. Pt with >50% relief of his radicular pain. Still with pain over the bilateral SIJ. \par \par 11/8/22: follow up after B/L L4-5 TFESI on 9/30/22.  Had 60% relief.  Would like TPI on left side and repeat injection.\par \par 09/19/2022: follow up today for B/L RFA on 8/2.  Had 80% relief so far.  Will give TPI and schedule TFESI.  \par 07/29/2022: follow up today.  Would like repeat RFA.\par \par 2/14/22- Patient here for follow up. Complains of pain in left leg radiating down to toes. Will schedule left L4-5, L5-S1. Also complains of pain in low back. Will repeat B/L lumbar RFA.\par \par 11/15/21- Patient here for f/u. Would like a repeat B/L SI joint RFA.\par \par 9/1/21: follow up today. pt reports increasing pain in the lower back with radiation to the SIJ's.\par \par 6/21/21: follow up today after caudal ROMULO on 6/8/21. He reports improvement. Right SIJ RFA was June of last year. and left RFA was in November of last year.\par \par 5/10/21: follow up today after caudal on 4/27/21. Had 80% relief from Caudal. Will Give TPI for tightness in right\par buttock.\par \par 4/20/21: follow up today after BERYL on 3/8/21. Since last visit had right shoulder surgery. (Dr. Vizcaino) on \par \par 3/30/21. Has not yet started PT. Neck feels better after BERYL.\par \par 3/3/21: follow up to review MRI. (3/1/21) 1. Straightening of lordosis with diffuse loss of disc signal and height and\par multilevel anterior spurring and bulging.\par 2. C2-C3: Bulge with central herniation.\par 3. C3-C4: Broad bulge and spondylotic ridge with central herniation. Luschka hypertrophy.\par 4. C4-C5: Broad bulge, spondylotic ridge, and broad herniation impressing on the thecal sac. Luschka\par hypertrophy with foraminal stenosis right greater than left.\par 5. C5-C6: Broad bulge, spondylotic ridge, and broad asymmetric to right and left herniation impressing on the\par thecal sac contacting the cord with mild central stenosis. Luschka hypertrophy with foraminal stenosis.\par 6. C6-C7: Broad bulge, spondylotic ridge, and central and asymmetric to right herniation impressing on the\par thecal sac. Luschka hypertrophy with foraminal stenosis left greater than right. Pain is radiating to the right shoulder and down the right arm.\par \par 2/23/21: follow up today after left L4/5 L5/S1 on 2/10/21. He reports an overall 80% improvement following. as his left side is feeling better, his right side is getting a little worse.\par \par 2/2/21: follow up today after b/l lumbar RFA on 1/19/21. getting gradual improvement. Had new MRI which was\par reviewed. It revealed L3/4 moderate to marked disc height loss. wide decompression and laminectomy with\par decompression of the spinal canal. L4/5 moderate to marked disc space height loss. wide decompression of the spinal canal without stenosis. broad based left disc herniation with impingement of the left L5 nerve root. facet arthrosis. moderate to marked left NF stenosis with impingement of the left L4 nerve root. L5/S1: wide decompression Dr. Miller is recommending a left L4 and L5 TFESI to address his left leg pain.\par \par 12/22/20: follow up today after bilateral L4/5 TFESI on 12/7/20. Pt with great relief following. He reports the sciatica has resolved. Pain continues over the right lower back. will give tpi today.\par \par 11/30/20- Patient would like TPI today. Will do without steroid as it is too soon. Wants to schedule left SI joint RFA.\par \par 11/2/20: follow up today. Gets good relief with TPI's. Gets RFA's with good relief. Last right SIJ RFA was in May with relief until recently. Pain in the lower back. unable to have Caudal due to recent surgery. Intermittent pain in the bilateral legs.\par \par 9/21/20- Patient had 80% relief from lumbar RFA. Cannot have SIJ RFA until November and December. TPI today\par \par 7.13.20 follow up today after left SIJ RFA. Pt reports an overall 85% relief following. Pain in the lower back. Had lumbar RFA in 12/2018 with relief until recently. He was able to walk longer distances and ROM improved as well. [] : no [FreeTextEntry9] : RFA [de-identified] : driving

## 2023-08-11 ENCOUNTER — APPOINTMENT (OUTPATIENT)
Dept: PAIN MANAGEMENT | Facility: CLINIC | Age: 58
End: 2023-08-11

## 2023-08-23 ENCOUNTER — APPOINTMENT (OUTPATIENT)
Dept: PAIN MANAGEMENT | Facility: CLINIC | Age: 58
End: 2023-08-23

## 2023-12-13 ENCOUNTER — APPOINTMENT (OUTPATIENT)
Dept: PAIN MANAGEMENT | Facility: CLINIC | Age: 58
End: 2023-12-13
Payer: MEDICARE

## 2023-12-13 VITALS — BODY MASS INDEX: 31.82 KG/M2 | WEIGHT: 198 LBS | HEIGHT: 66 IN

## 2023-12-13 PROCEDURE — ZZZZZ: CPT

## 2023-12-13 NOTE — HISTORY OF PRESENT ILLNESS
[Lower back] : lower back [Left Leg] : left leg [Right Leg] : right leg [Dull/Aching] : dull/aching [Radiating] : radiating [Sharp] : sharp [Throbbing] : throbbing [Constant] : constant [Household chores] : household chores [Leisure] : leisure [Sleep] : sleep [Social interactions] : social interactions [Rest] : rest [Meds] : meds [Ice] : ice [Injection therapy] : injection therapy [Sitting] : sitting [Standing] : standing [Walking] : walking [Bending forward] : bending forward [Stairs] : stairs [7] : 7 [3] : 3 [FreeTextEntry1] : 12/13/2023: follow up today.  Had intracept September 18.  Now has worsening SI joints.  Would like RFA.    6/28/23- fu for B/L Lumbar RFA on 6/12 had 60% relief so far.  Would like TPI.    5/30/23- fu for B/L L4-L5 TFESI on 4/25 40% relief. still with pain in the back with radiation to the lateral thighs.  worse with extension.   4/18/23: follow up today. Had shoulder injection with Dr. Rhodes with improvement. has history of RA  and now having CMC joint pain.  Pain is currently in the lower back with radiation to the hips.  Most relief with TFESI's.   12/9/22: follow up today after  B/L TFESI L4-5 on 11/21/22. Pt with >50% relief of his radicular pain. Still with pain over the bilateral SIJ.   11/8/22: follow up after B/L L4-5 TFESI on 9/30/22.  Had 60% relief.  Would like TPI on left side and repeat injection.  09/19/2022: follow up today for B/L RFA on 8/2.  Had 80% relief so far.  Will give TPI and schedule TFESI.   07/29/2022: follow up today.  Would like repeat RFA.  2/14/22- Patient here for follow up. Complains of pain in left leg radiating down to toes. Will schedule left L4-5, L5-S1. Also complains of pain in low back. Will repeat B/L lumbar RFA.  11/15/21- Patient here for f/u. Would like a repeat B/L SI joint RFA.  9/1/21: follow up today. pt reports increasing pain in the lower back with radiation to the SIJ's.  6/21/21: follow up today after caudal ROMULO on 6/8/21. He reports improvement. Right SIJ RFA was June of last year. and left RFA was in November of last year.  5/10/21: follow up today after caudal on 4/27/21. Had 80% relief from Caudal. Will Give TPI for tightness in right buttock.  4/20/21: follow up today after BERYL on 3/8/21. Since last visit had right shoulder surgery. (Dr. Vizcaino) on   3/30/21. Has not yet started PT. Neck feels better after BERYL.  3/3/21: follow up to review MRI. (3/1/21) 1. Straightening of lordosis with diffuse loss of disc signal and height and multilevel anterior spurring and bulging. 2. C2-C3: Bulge with central herniation. 3. C3-C4: Broad bulge and spondylotic ridge with central herniation. Luschka hypertrophy. 4. C4-C5: Broad bulge, spondylotic ridge, and broad herniation impressing on the thecal sac. Luschka hypertrophy with foraminal stenosis right greater than left. 5. C5-C6: Broad bulge, spondylotic ridge, and broad asymmetric to right and left herniation impressing on the thecal sac contacting the cord with mild central stenosis. Luschka hypertrophy with foraminal stenosis. 6. C6-C7: Broad bulge, spondylotic ridge, and central and asymmetric to right herniation impressing on the thecal sac. Luschka hypertrophy with foraminal stenosis left greater than right. Pain is radiating to the right shoulder and down the right arm.  2/23/21: follow up today after left L4/5 L5/S1 on 2/10/21. He reports an overall 80% improvement following. as his left side is feeling better, his right side is getting a little worse.  2/2/21: follow up today after b/l lumbar RFA on 1/19/21. getting gradual improvement. Had new MRI which was reviewed. It revealed L3/4 moderate to marked disc height loss. wide decompression and laminectomy with decompression of the spinal canal. L4/5 moderate to marked disc space height loss. wide decompression of the spinal canal without stenosis. broad based left disc herniation with impingement of the left L5 nerve root. facet arthrosis. moderate to marked left NF stenosis with impingement of the left L4 nerve root. L5/S1: wide decompression Dr. Miller is recommending a left L4 and L5 TFESI to address his left leg pain.  12/22/20: follow up today after bilateral L4/5 TFESI on 12/7/20. Pt with great relief following. He reports the sciatica has resolved. Pain continues over the right lower back. will give tpi today.  11/30/20- Patient would like TPI today. Will do without steroid as it is too soon. Wants to schedule left SI joint RFA.  11/2/20: follow up today. Gets good relief with TPI's. Gets RFA's with good relief. Last right SIJ RFA was in May with relief until recently. Pain in the lower back. unable to have Caudal due to recent surgery. Intermittent pain in the bilateral legs.  9/21/20- Patient had 80% relief from lumbar RFA. Cannot have SIJ RFA until November and December. TPI today  7.13.20 follow up today after left SIJ RFA. Pt reports an overall 85% relief following. Pain in the lower back. Had lumbar RFA in 12/2018 with relief until recently. He was able to walk longer distances and ROM improved as well. [] : no [FreeTextEntry9] : RFA [de-identified] : driving

## 2024-01-11 ENCOUNTER — APPOINTMENT (OUTPATIENT)
Dept: PAIN MANAGEMENT | Facility: CLINIC | Age: 59
End: 2024-01-11
Payer: MEDICARE

## 2024-01-11 DIAGNOSIS — M53.3 SACROCOCCYGEAL DISORDERS, NOT ELSEWHERE CLASSIFIED: ICD-10-CM

## 2024-01-11 PROCEDURE — 64625 RF ABLTJ NRV NRVTG SI JT: CPT | Mod: 50

## 2024-01-11 NOTE — PROCEDURE
[FreeTextEntry3] : Date of Service: 01/11/2024   Account: [id number]  Patient: DEEDEE SANZ   YOB: 1965  Age: 58 year   PREOPERATIVE DIAGNOSIS:Sacroilitis     1.           POSTOPERATIVE DIAGNOSIS: Sacroilitis      1.           PROCEDURE:          1) Right and Left Sacroiliac Joint radiofrequency ablation under fluoroscopic guidance.                          Anesthesia:                                                      None   Risks, benefits and alternatives of the procedure were discussed with the patient after which they agreed to proceed.  Patient was brought into fluoroscopy suite and was placed in prone position with hip support. Back was prepped and draped in a sterile fashion.   Under AP visualization, the right and left sacral ala was identified and marked. Using a 25 gauge  inch needle the skin and subcutaneous structures at this point were localized with 1% Lidocaine using approximately 3 cc's 1% Lidocaine.  After this, a 20 gauge 50mm Kesha radiofrequency needle with a 10mm curved tip was inserted and using depth direction depth technique under constant fluoroscopic visualization, the needle was advanced to the lateral sacral Foramen at S1, S2 and S3.     The stylette for the most cephalad needle at the S1 level was then removed and a [place] radiofrequency probe was then placed inside the needle. After their pedis' were checked at approximately 300 ohm, 50 hertz sensory stimulation was performed.  Patient experienced concordant pain in their low back at approximately 0.3 volts. The voltage was then increased to 1 volt. The patient reported increased low back pain symptoms without any radiation below the knee.  Stimulation was then changed to 2 hertz and increased slowly by .1 volt increments at approximately 0.5 volts, patient began to experience thumping like reproductive pain in their low back. The voltage was then increased to approximately 2.5 volts. Patient experienced increasing thumping without any sensation below their knee. This exact stimulation was then repeated for the S2 and S3 needles with concordant pain and no radiation below the knee. The 6 levels were then anesthetized with approximately 0.5 cc's of 1% Lidocaine. After which each area was then ablated Bipolar at 80 degrees centigrade for 90 seconds each. Patient felt no pain reproduction during the ablation procedure.  After each of these levels were ablated and injected approximately 1 cc of 0.25% Bupivacaine plus 80 mg of Kenalog were then injected before the needles were removed.  Pressure was then applied to the low back. Band-aids were applied.  Patient was brought to the recovery, ambulated on their own after the procedure and reported decreased low back pain.   Patient was told to apply ice to the low back for 20 minutes on and 20 minutes off for focal symptoms for 24-48 hours. They should call the office if they have any questions or concerns.   Jenny Em M.D.

## 2024-01-25 ENCOUNTER — APPOINTMENT (OUTPATIENT)
Dept: PAIN MANAGEMENT | Facility: CLINIC | Age: 59
End: 2024-01-25

## 2024-02-14 ENCOUNTER — APPOINTMENT (OUTPATIENT)
Dept: PAIN MANAGEMENT | Facility: CLINIC | Age: 59
End: 2024-02-14
Payer: MEDICARE

## 2024-02-14 VITALS — BODY MASS INDEX: 31.66 KG/M2 | WEIGHT: 197 LBS | HEIGHT: 66 IN

## 2024-02-14 PROCEDURE — 99215 OFFICE O/P EST HI 40 MIN: CPT | Mod: 25

## 2024-02-14 PROCEDURE — 20552 NJX 1/MLT TRIGGER POINT 1/2: CPT

## 2024-02-14 PROCEDURE — J3490M: CUSTOM

## 2024-02-14 NOTE — HISTORY OF PRESENT ILLNESS
[Lower back] : lower back [Left Leg] : left leg [Right Leg] : right leg [3] : 3 [Dull/Aching] : dull/aching [Radiating] : radiating [Sharp] : sharp [Throbbing] : throbbing [Constant] : constant [Household chores] : household chores [Leisure] : leisure [Sleep] : sleep [Social interactions] : social interactions [Rest] : rest [Meds] : meds [Ice] : ice [Injection therapy] : injection therapy [Sitting] : sitting [Standing] : standing [Walking] : walking [Bending forward] : bending forward [Stairs] : stairs [4] : 4 [FreeTextEntry1] : 02/14/2024: follow up today for B/L SI joint RFA on 1/11. Patient reports some reduction of lower back pain s/p ablation (50% overall reduction of pain), he still complains of radicular pain bilaterally to the lower extremities (L>R). He takes Aleve PRN for pain management. Would like TPI in office.   12/13/2023: follow up today.  Had intracept September 18.  Now has worsening SI joints.  Would like RFA.    6/28/23- fu for B/L Lumbar RFA on 6/12 had 60% relief so far.  Would like TPI.    5/30/23- fu for B/L L4-L5 TFESI on 4/25 40% relief. still with pain in the back with radiation to the lateral thighs.  worse with extension.   4/18/23: follow up today. Had shoulder injection with Dr. Rhodes with improvement. has history of RA  and now having CMC joint pain.  Pain is currently in the lower back with radiation to the hips.  Most relief with TFESI's.   12/9/22: follow up today after  B/L TFESI L4-5 on 11/21/22. Pt with >50% relief of his radicular pain. Still with pain over the bilateral SIJ.   11/8/22: follow up after B/L L4-5 TFESI on 9/30/22.  Had 60% relief.  Would like TPI on left side and repeat injection.  09/19/2022: follow up today for B/L RFA on 8/2.  Had 80% relief so far.  Will give TPI and schedule TFESI.   07/29/2022: follow up today.  Would like repeat RFA.  2/14/22- Patient here for follow up. Complains of pain in left leg radiating down to toes. Will schedule left L4-5, L5-S1. Also complains of pain in low back. Will repeat B/L lumbar RFA.  11/15/21- Patient here for f/u. Would like a repeat B/L SI joint RFA.  9/1/21: follow up today. pt reports increasing pain in the lower back with radiation to the SIJ's.  6/21/21: follow up today after caudal ROMULO on 6/8/21. He reports improvement. Right SIJ RFA was June of last year. and left RFA was in November of last year.  5/10/21: follow up today after caudal on 4/27/21. Had 80% relief from Caudal. Will Give TPI for tightness in right buttock.  4/20/21: follow up today after BERYL on 3/8/21. Since last visit had right shoulder surgery. (Dr. Vizcaino) on   3/30/21. Has not yet started PT. Neck feels better after BERYL.  3/3/21: follow up to review MRI. (3/1/21) 1. Straightening of lordosis with diffuse loss of disc signal and height and multilevel anterior spurring and bulging. 2. C2-C3: Bulge with central herniation. 3. C3-C4: Broad bulge and spondylotic ridge with central herniation. Luschka hypertrophy. 4. C4-C5: Broad bulge, spondylotic ridge, and broad herniation impressing on the thecal sac. Luschka hypertrophy with foraminal stenosis right greater than left. 5. C5-C6: Broad bulge, spondylotic ridge, and broad asymmetric to right and left herniation impressing on the thecal sac contacting the cord with mild central stenosis. Luschka hypertrophy with foraminal stenosis. 6. C6-C7: Broad bulge, spondylotic ridge, and central and asymmetric to right herniation impressing on the thecal sac. Luschka hypertrophy with foraminal stenosis left greater than right. Pain is radiating to the right shoulder and down the right arm.  2/23/21: follow up today after left L4/5 L5/S1 on 2/10/21. He reports an overall 80% improvement following. as his left side is feeling better, his right side is getting a little worse.  2/2/21: follow up today after b/l lumbar RFA on 1/19/21. getting gradual improvement. Had new MRI which was reviewed. It revealed L3/4 moderate to marked disc height loss. wide decompression and laminectomy with decompression of the spinal canal. L4/5 moderate to marked disc space height loss. wide decompression of the spinal canal without stenosis. broad based left disc herniation with impingement of the left L5 nerve root. facet arthrosis. moderate to marked left NF stenosis with impingement of the left L4 nerve root. L5/S1: wide decompression Dr. Miller is recommending a left L4 and L5 TFESI to address his left leg pain.  12/22/20: follow up today after bilateral L4/5 TFESI on 12/7/20. Pt with great relief following. He reports the sciatica has resolved. Pain continues over the right lower back. will give tpi today.  11/30/20- Patient would like TPI today. Will do without steroid as it is too soon. Wants to schedule left SI joint RFA.  11/2/20: follow up today. Gets good relief with TPI's. Gets RFA's with good relief. Last right SIJ RFA was in May with relief until recently. Pain in the lower back. unable to have Caudal due to recent surgery. Intermittent pain in the bilateral legs.  9/21/20- Patient had 80% relief from lumbar RFA. Cannot have SIJ RFA until November and December. TPI today  7.13.20 follow up today after left SIJ RFA. Pt reports an overall 85% relief following. Pain in the lower back. Had lumbar RFA in 12/2018 with relief until recently. He was able to walk longer distances and ROM improved as well. [] : no [FreeTextEntry7] : b/l lower extremities (L>R) [FreeTextEntry9] : RFA [de-identified] : driving

## 2024-02-14 NOTE — ASSESSMENT
[FreeTextEntry1] : The risks, benefits, contents and alternatives to injection were explained in full to the patient.  Risks outlined include but are not limited to infection, sepsis, bleeding, scarring, skin discoloration, temporary increase in pain, syncopal episode, failure to resolve symptoms, allergic reaction, flare reaction, permanent white skin discoloration, symptom recurrence, and elevation of blood sugar in diabetics.  Patient understood the risks.  All questions were answered.  After discussion of options, patient requested an injection.  Oral informed consent was obtained and sterile prep was done of the injection site.  Sterile technique was used to introduce the mixture. The mixture consisted of 2 cc 1% lidocaine, 2cc 0.25% marcaine, and 20mg of kenalog.  Patient tolerated the procedure well.  Patient advised to ice the injection site this evening.  Signs and symptoms of infection reviewed and patient advised to call immediately for redness, fevers, and/or chills. Patient is presenting with acute/sub-acute radicular pain with impairment in ADLs and functionality.  The pain has not responded sufficiently to  conservative care including nsaid therapy and/or physical therapy.  There is no bleeding tendency, unstable medical condition, or systemic infection. The purpose of the spinal injections is to facilitate active therapy by providing short term relief through reduction of pain and inflammation.   Injections, by themselves, are not likely to provide long-term relief. Rather, active rehabilitation with modified work achieves long-term relief by increasing active ROM, strength and stability. After discussing various treatment options with the patient including but not limited to oral medications, physical therapy, exercise, modalities as well as interventional spinal injections, we have decided with the following plan:  1) Intervention Injection Therapy: I personally reviewed the MRI/CT scan images and agree with the radiologist's report. The radiological findings were discussed with the patient. The risks, benefits, contents and alternatives to injection were explained in full to the patient. Risks outlined include but are not limited to infection,sepsis, bleeding, post-dural puncture headache, nerve damage, temporary increase in pain, syncopal episode, failure to resolve symptoms, allergic reaction, symptom recurrence, and elevation of blood sugar in diabetics. Cortisone may cause immunosuppression. Patient understands the risks. All questions were answered. After discussion of options, patient requested an injection. Information regarding the injection was given to the patient. Which medications to stop prior to the injection was explained to the patient as well.  Follow up in 1-2 weeks post injection for re-evaluation.  Continue Home exercises, stretching, activity modification, physical therapy, and conservative care.   B/L L4/5 TFESI

## 2024-02-29 ENCOUNTER — APPOINTMENT (OUTPATIENT)
Dept: PAIN MANAGEMENT | Facility: CLINIC | Age: 59
End: 2024-02-29
Payer: MEDICARE

## 2024-02-29 PROCEDURE — 64483 NJX AA&/STRD TFRM EPI L/S 1: CPT | Mod: 50

## 2024-02-29 NOTE — PROCEDURE
[FreeTextEntry3] : Date of Service: 02/29/2024   Account: [id number]   Patient: DEEDEE SANZ   YOB: 1965   Age: 58 year     Surgeon:                               Jenny Em M.D.   Assistant:                                 None.   Pre-Operative Diagnosis:     Lumbosacral Radiculitis (M54.17)                  Post Operative Diagnosis:    Lumbosacral Radiculitis (M54.17)                  Procedure:                              Right L4-5 transforaminal epidural steroid injection                                                     Left L4-5 transforaminal epidural steroid injection under fluoroscopic guidance.   Anesthesia:                             None     This procedure was carried out using fluoroscopic guidance.  The risks and benefits of the procedure were discussed extensively with the patient.  The consent of the patient was obtained and the following procedure was performed. The patient was placed in the prone position on the fluoroscopic table and the lumbar area was prepped and draped in a sterile fashion.   The left L4-5 neural foramen was then identified on left oblique  "isidro dog" anatomical view at the 6 o' clock position using fluoroscopic guidance, and the area was marked. The overlying skin and subcutaneous structures were anesthetized using sterile technique with 1% Lidocaine.  A 22 gauge spinal needle was directed toward the inferior (6 o'clock) position of the pedicle, which formed the roof of the identified foramen.  Once in the epidural space, after negative aspiration for heme and CSF, 1cc of Omnipaque contrast was injected to confirm epidural location and assess filling defects and rule out intravascular needle placement.   The following contrast flow observed: no intravascular or intrathecal flow pattern was noted.  No blood or CSF was aspirated. Omnipaque spread medially in epidural space.   After this, an injectate of 3 cc preservative free normal saline plus 40 mg of Kenalog was injected in the epidural space.   The right L4-5 neural foramen was then identified on right oblique "isidro dog" anatomical view at the 6 o' clock position using fluoroscopic guidance, and the area was marked. The overlying skin and subcutaneous structures were anesthetized using sterile technique with 1% Lidocaine.  A 22 gauge spinal needle was directed toward the inferior (6 o'clock) position of the pedicle, which formed the roof of the identified foramen.  Once in the epidural space, after negative aspiration for heme and CSF, 1cc of Omnipaque contrast was injected to confirm epidural location and assess filling defects and rule out intravascular needle placement.   The following contrast flow observed: no intravascular or intrathecal flow pattern was noted.  No blood or CSF was aspirated. Omnipaque spread medially in epidural space.   After this, an injectate of 3 cc preservative free normal saline plus 40 mg of Kenalog was injected in the epidural space.   The needle was subsequently removed.  Vital signs remained normal.  Pulse oximeter was used throughout the procedure and the patient's pulse and oxygen saturation remained within normal limits.  The patient tolerated the procedure well.  There were no complications.  The patient was instructed to apply ice over the injection sites for twenty minutes every two hours for the next 24 to 48 hours.  The patient was also instructed to contact me immediately if there were any problems.   Jenny Em M.D.

## 2024-03-13 ENCOUNTER — APPOINTMENT (OUTPATIENT)
Dept: PAIN MANAGEMENT | Facility: CLINIC | Age: 59
End: 2024-03-13
Payer: MEDICARE

## 2024-03-13 VITALS — HEIGHT: 66 IN | BODY MASS INDEX: 31.34 KG/M2 | WEIGHT: 195 LBS

## 2024-03-13 DIAGNOSIS — M54.16 RADICULOPATHY, LUMBAR REGION: ICD-10-CM

## 2024-03-13 PROCEDURE — ZZZZZ: CPT

## 2024-03-13 RX ORDER — MELOXICAM 15 MG/1
15 TABLET ORAL
Qty: 30 | Refills: 2 | Status: ACTIVE | COMMUNITY
Start: 2024-03-13 | End: 1900-01-01

## 2024-03-13 NOTE — HISTORY OF PRESENT ILLNESS
[Lower back] : lower back [Right Leg] : right leg [Left Leg] : left leg [4] : 4 [Dull/Aching] : dull/aching [Sharp] : sharp [Radiating] : radiating [Throbbing] : throbbing [Constant] : constant [Household chores] : household chores [Leisure] : leisure [Social interactions] : social interactions [Sleep] : sleep [Meds] : meds [Rest] : rest [Ice] : ice [Injection therapy] : injection therapy [Sitting] : sitting [Walking] : walking [Standing] : standing [Bending forward] : bending forward [Stairs] : stairs [3] : 3 [FreeTextEntry1] : 3/13/24- fu with B/L L4/5 TFESI on 2/29/24. Patient reports a 60% reduction of pain s/p epidural, takes Aleve sparingly for pain management. Patient still has pain across the lower back with radiation bilaterally to the lower extremities.   Would like to schedule a lumbar RFA  02/14/2024: follow up today for B/L SI joint RFA on 1/11. Patient reports some reduction of lower back pain s/p ablation (50% overall reduction of pain), he still complains of radicular pain bilaterally to the lower extremities (L>R). He takes Aleve PRN for pain management. Would like TPI in office.   12/13/2023: follow up today.  Had intracept September 18.  Now has worsening SI joints.  Would like RFA.    6/28/23- fu for B/L Lumbar RFA on 6/12 had 60% relief so far.  Would like TPI.    5/30/23- fu for B/L L4-L5 TFESI on 4/25 40% relief. still with pain in the back with radiation to the lateral thighs.  worse with extension.   4/18/23: follow up today. Had shoulder injection with Dr. Rhodes with improvement. has history of RA  and now having CMC joint pain.  Pain is currently in the lower back with radiation to the hips.  Most relief with TFESI's.   12/9/22: follow up today after  B/L TFESI L4-5 on 11/21/22. Pt with >50% relief of his radicular pain. Still with pain over the bilateral SIJ.   11/8/22: follow up after B/L L4-5 TFESI on 9/30/22.  Had 60% relief.  Would like TPI on left side and repeat injection.  09/19/2022: follow up today for B/L RFA on 8/2.  Had 80% relief so far.  Will give TPI and schedule TFESI.   07/29/2022: follow up today.  Would like repeat RFA.  2/14/22- Patient here for follow up. Complains of pain in left leg radiating down to toes. Will schedule left L4-5, L5-S1. Also complains of pain in low back. Will repeat B/L lumbar RFA.  11/15/21- Patient here for f/u. Would like a repeat B/L SI joint RFA.  9/1/21: follow up today. pt reports increasing pain in the lower back with radiation to the SIJ's.  6/21/21: follow up today after caudal ROMULO on 6/8/21. He reports improvement. Right SIJ RFA was June of last year. and left RFA was in November of last year.  5/10/21: follow up today after caudal on 4/27/21. Had 80% relief from Caudal. Will Give TPI for tightness in right buttock.  4/20/21: follow up today after BERYL on 3/8/21. Since last visit had right shoulder surgery. (Dr. Vizcaino) on   3/30/21. Has not yet started PT. Neck feels better after BERYL.  3/3/21: follow up to review MRI. (3/1/21) 1. Straightening of lordosis with diffuse loss of disc signal and height and multilevel anterior spurring and bulging. 2. C2-C3: Bulge with central herniation. 3. C3-C4: Broad bulge and spondylotic ridge with central herniation. Luschka hypertrophy. 4. C4-C5: Broad bulge, spondylotic ridge, and broad herniation impressing on the thecal sac. Luschka hypertrophy with foraminal stenosis right greater than left. 5. C5-C6: Broad bulge, spondylotic ridge, and broad asymmetric to right and left herniation impressing on the thecal sac contacting the cord with mild central stenosis. Luschka hypertrophy with foraminal stenosis. 6. C6-C7: Broad bulge, spondylotic ridge, and central and asymmetric to right herniation impressing on the thecal sac. Luschka hypertrophy with foraminal stenosis left greater than right. Pain is radiating to the right shoulder and down the right arm.  2/23/21: follow up today after left L4/5 L5/S1 on 2/10/21. He reports an overall 80% improvement following. as his left side is feeling better, his right side is getting a little worse.  2/2/21: follow up today after b/l lumbar RFA on 1/19/21. getting gradual improvement. Had new MRI which was reviewed. It revealed L3/4 moderate to marked disc height loss. wide decompression and laminectomy with decompression of the spinal canal. L4/5 moderate to marked disc space height loss. wide decompression of the spinal canal without stenosis. broad based left disc herniation with impingement of the left L5 nerve root. facet arthrosis. moderate to marked left NF stenosis with impingement of the left L4 nerve root. L5/S1: wide decompression Dr. Miller is recommending a left L4 and L5 TFESI to address his left leg pain.  12/22/20: follow up today after bilateral L4/5 TFESI on 12/7/20. Pt with great relief following. He reports the sciatica has resolved. Pain continues over the right lower back. will give tpi today.  11/30/20- Patient would like TPI today. Will do without steroid as it is too soon. Wants to schedule left SI joint RFA.  11/2/20: follow up today. Gets good relief with TPI's. Gets RFA's with good relief. Last right SIJ RFA was in May with relief until recently. Pain in the lower back. unable to have Caudal due to recent surgery. Intermittent pain in the bilateral legs.  9/21/20- Patient had 80% relief from lumbar RFA. Cannot have SIJ RFA until November and December. TPI today  7.13.20 follow up today after left SIJ RFA. Pt reports an overall 85% relief following. Pain in the lower back. Had lumbar RFA in 12/2018 with relief until recently. He was able to walk longer distances and ROM improved as well. [] : no [FreeTextEntry7] : b/l lower extremities (L>R) [FreeTextEntry9] : RFA [de-identified] : driving

## 2024-03-13 NOTE — ASSESSMENT
[FreeTextEntry1] : The risks, benefits, contents and alternatives to injection were explained in full to the patient.  Risks outlined include but are not limited to infection, sepsis, bleeding, scarring, skin discoloration, temporary increase in pain, syncopal episode, failure to resolve symptoms, allergic reaction, flare reaction, permanent white skin discoloration, symptom recurrence, and elevation of blood sugar in diabetics.  Patient understood the risks.  All questions were answered.  After discussion of options, patient requested an injection.  Oral informed consent was obtained and sterile prep was done of the injection site.  Sterile technique was used to introduce the mixture. The mixture consisted of 2 cc 1% lidocaine, 2cc 0.25% marcaine.  Patient tolerated the procedure well.  Patient advised to ice the injection site this evening.  Signs and symptoms of infection reviewed and patient advised to call immediately for redness, fevers, and/or chills. Patient is presenting with acute/sub-acute radicular pain with impairment in ADLs and functionality.  The pain has not responded sufficiently to  conservative care including nsaid therapy and/or physical therapy.  There is no bleeding tendency, unstable medical condition, or systemic infection. The purpose of the spinal injections is to facilitate active therapy by providing short term relief through reduction of pain and inflammation.   Injections, by themselves, are not likely to provide long-term relief. Rather, active rehabilitation with modified work achieves long-term relief by increasing active ROM, strength and stability. After discussing various treatment options with the patient including but not limited to oral medications, physical therapy, exercise, modalities as well as interventional spinal injections, we have decided with the following plan:  1) Intervention Injection Therapy: I personally reviewed the MRI/CT scan images and agree with the radiologist's report. The radiological findings were discussed with the patient. The risks, benefits, contents and alternatives to injection were explained in full to the patient. Risks outlined include but are not limited to infection,sepsis, bleeding, post-dural puncture headache, nerve damage, temporary increase in pain, syncopal episode, failure to resolve symptoms, allergic reaction, symptom recurrence, and elevation of blood sugar in diabetics. Cortisone may cause immunosuppression. Patient understands the risks. All questions were answered. After discussion of options, patient requested an injection. Information regarding the injection was given to the patient. Which medications to stop prior to the injection was explained to the patient as well.  Follow up in 1-2 weeks post injection for re-evaluation.  Continue Home exercises, stretching, activity modification, physical therapy, and conservative care.   B/L lumbar RFA  TPI lumbar paraspinals no steroid
Pt with hx of syncope (no prior workup) presenting with another episode of syncope. No post ictal and unlikely seizure. Syncope workup in ED and CDU for Echo.

## 2024-03-28 ENCOUNTER — APPOINTMENT (OUTPATIENT)
Dept: PAIN MANAGEMENT | Facility: CLINIC | Age: 59
End: 2024-03-28

## 2024-03-28 DIAGNOSIS — M47.816 SPONDYLOSIS W/OUT MYELOPATHY OR RADICULOPATHY, LUMBAR REGION: ICD-10-CM

## 2024-03-28 PROCEDURE — 82948 REAGENT STRIP/BLOOD GLUCOSE: CPT | Mod: 1L

## 2024-03-28 PROCEDURE — 64635 DESTROY LUMB/SAC FACET JNT: CPT | Mod: 1L,59,RT

## 2024-03-28 PROCEDURE — 64636Z: CUSTOM | Mod: 59,LT

## 2024-03-28 NOTE — PROCEDURE
[FreeTextEntry3] : Date of Service: 03/28/2024   Account: [id number]   Patient: DEEDEE SANZ   YOB: 1965   Age: 59 year     PREOPERATIVE DIAGNOSIS: Spondylosis of Lumbar Region without Myelopathy or Radiculopathy (M47.816)       1.           POSTOPERATIVE DIAGNOSIS: Spondylosis of Lumbar Region without Myelopathy or Radiculopathy (M47.816)       1.           PROCEDURE:           1) Right L3, L4, L5 and Left L3, L4, L5 medial branch radiofrequency ablation under fluoroscopic guidance.                         Anesthesia:                                                      None     Risks, benefits and alternatives of the procedure were discussed with the patient after which they agreed to proceed.  Patient was brought into fluoroscopy suite and was placed in prone position with hip support. Back was prepped and draped in a sterile fashion.   Under AP visualization, the right and left sacral ala was identified and marked. Using a 25 gauge  inch needle the skin and subcutaneous structures at this point were localized with 1% Lidocaine using approximately 3 cc's 1% Lidocaine.  After this, a 20 gauge 100mm Kesha radiofrequency needle with a 10mm curved tip was inserted and using depth direction depth technique under constant fluoroscopic visualization, the needle was advanced to the sacral ala until os was contacted.   The camera was then redirected under AP view to visualize the right and left L4 and L5 vertebra. The camera was obliqued to approximately 30 degrees to reveal good Mark dog anatomical view. The junction of the superior articulate process and transverse process at the L4 and L5 levels  were then identified and marked. Skin and subcutaneous structures were then anesthetized with approximately 3 cc's of 1% Lidocaine at each of these levels. After which a 20 gauge 100mm [place] radiofrequency needle with a 10mm curved tip then advanced until the junction at the SAP and transverse process was met.  The camera was then directed through the lateral view and under constant fluoroscopic visualization, the needle tips were then advanced and confirmed at the junction of the SAP and transverse process.   The stylette for the most cephalad needle at the L4 level was then removed and a [place] radiofrequency probe was then placed inside the needle. After their pedis' were checked at approximately 300 ohm, 50 hertz sensory stimulation was performed.  Patient experienced concordant pain in their low back at approximately 0.3 volts. The voltage was then increased to 1 volt. The patient reported increased low back pain symptoms without any radiation below the knee.  Stimulation was then changed to 2 hertz and increased slowly by .1 volt increments at approximately 0.5 volts, patient began to experience thumping like reproductive pain in their low back. The voltage was then increased to approximately 2.5 volts. Patient experienced increasing thumping without any sensation below their knee. This exact stimulation was then repeated for the L5 needle as well as sacral ala needle with concordant pain and no radiation below the knee. The 6 levels were then anesthetized with approximately 0.5 cc's of 1% Lidocaine. After which each area was then ablated at 80 degrees centigrade for 90 seconds each. Patient felt no pain reproduction during the ablation procedure.  After each of these levels were ablated and injected approximately 1 cc of 0.25% Bupivacaine plus 80 mg of Kenalog were then injected before the needles were removed.  Pressure was then applied to the low back. Band-aids were applied.  Patient was brought to the recovery, ambulated on their own after the procedure and reported decreased low back pain.    Patient was told to apply ice to the low back for 20 minutes on and 20 minutes off for focal symptoms for 24-48 hours. They should call the office if they have any questions or concerns.   Jenny Em M.D.

## 2024-04-10 ENCOUNTER — APPOINTMENT (OUTPATIENT)
Dept: PAIN MANAGEMENT | Facility: CLINIC | Age: 59
End: 2024-04-10

## 2025-02-03 ENCOUNTER — RX RENEWAL (OUTPATIENT)
Age: 60
End: 2025-02-03

## 2025-05-02 ENCOUNTER — RX RENEWAL (OUTPATIENT)
Age: 60
End: 2025-05-02